# Patient Record
Sex: MALE | Race: WHITE | NOT HISPANIC OR LATINO | Employment: OTHER | URBAN - METROPOLITAN AREA
[De-identification: names, ages, dates, MRNs, and addresses within clinical notes are randomized per-mention and may not be internally consistent; named-entity substitution may affect disease eponyms.]

---

## 2017-02-09 ENCOUNTER — TRANSFERRED RECORDS (OUTPATIENT)
Dept: HEALTH INFORMATION MANAGEMENT | Facility: CLINIC | Age: 72
End: 2017-02-09

## 2017-12-18 ENCOUNTER — TRANSFERRED RECORDS (OUTPATIENT)
Dept: HEALTH INFORMATION MANAGEMENT | Facility: CLINIC | Age: 72
End: 2017-12-18

## 2018-02-20 ENCOUNTER — TRANSFERRED RECORDS (OUTPATIENT)
Dept: HEALTH INFORMATION MANAGEMENT | Facility: CLINIC | Age: 73
End: 2018-02-20

## 2018-03-24 ENCOUNTER — ANESTHESIA EVENT (OUTPATIENT)
Dept: SURGERY | Facility: CLINIC | Age: 73
End: 2018-03-24
Payer: COMMERCIAL

## 2018-03-24 NOTE — ANESTHESIA PREPROCEDURE EVALUATION
Anesthesia Evaluation     . Pt has had prior anesthetic. Type: General and MAC    No history of anesthetic complications          ROS/MED HX    ENT/Pulmonary:     (+)sleep apnea, tobacco use, Past use uses CPAP occ cmH2O , . Other pulmonary disease SOB with exertion but can do things like chop wood with frequent breaks .    Neurologic:  - neg neurologic ROS     Cardiovascular:     (+) Dyslipidemia, hypertension--CAD, -CABG-date: x1 5/2016, . : . CHF . ALMONTE, . :. valvular problems/murmurs AVR 5/2016:. Previous cardiac testing Echodate:2/9/18results:Summary:  Bioprosthetic valve is present in the aortic position. Mean gradient 14mm Hg.   Paradoxical septal motion is present. Latter consistent w/ post-thoracotomy state.  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   Left ventricular ejection fraction, by visual est, is 60-65%.  Inferior vena cava is mildly dilated w/ greater than 50% respiratory variation.  In comparison to previous study dated 5/27/2016, LVEF has improved.Stress Testdate:8-3-2000 results:normalECG reviewed date:3/1/2018 results:NSR, RBBB, T-wave abnormality--consider inferior ischemia date: results:         : 2016.   METS/Exercise Tolerance:  >4 METS   Hematologic:  - neg hematologic  ROS       Musculoskeletal:   (+) , , other musculoskeletal- left rotator cuff tear       GI/Hepatic:  - neg GI/hepatic ROS       Renal/Genitourinary:  - ROS Renal section negative       Endo:  - neg endo ROS       Psychiatric:  - neg psychiatric ROS       Infectious Disease:  - neg infectious disease ROS       Malignancy:      - no malignancy   Other:    - neg other ROS                 Physical Exam  Normal systems: cardiovascular, pulmonary and dental    Airway   Mallampati: II  TM distance: >3 FB  Neck ROM: full    Dental     Cardiovascular       Pulmonary                     Anesthesia Plan      History & Physical Review  History and physical reviewed and following  examination; no interval change.    ASA Status:  3 .    NPO Status:  > 8 hours    Plan for General, ETT and Periph. Nerve Block for postop pain with Propofol and Intravenous induction. Maintenance will be Balanced.    PONV prophylaxis:  Ondansetron (or other 5HT-3) and Dexamethasone or Solumedrol  Additional equipment: Videolaryngoscope Scanned H&P reviewed      Postoperative Care  Postoperative pain management:  IV analgesics, Oral pain medications and Peripheral nerve block (Single Shot).      Consents  Anesthetic plan, risks, benefits and alternatives discussed with:  Patient and Daughter/Son..                          .

## 2018-03-26 ASSESSMENT — LIFESTYLE VARIABLES: TOBACCO_USE: 1

## 2018-03-27 ENCOUNTER — SURGERY (OUTPATIENT)
Age: 73
End: 2018-03-27

## 2018-03-27 ENCOUNTER — HOSPITAL ENCOUNTER (OUTPATIENT)
Facility: CLINIC | Age: 73
Discharge: HOME | End: 2018-03-27
Attending: ORTHOPAEDIC SURGERY | Admitting: ORTHOPAEDIC SURGERY
Payer: COMMERCIAL

## 2018-03-27 ENCOUNTER — ANESTHESIA (OUTPATIENT)
Dept: SURGERY | Facility: CLINIC | Age: 73
End: 2018-03-27
Payer: COMMERCIAL

## 2018-03-27 VITALS
BODY MASS INDEX: 23.03 KG/M2 | HEART RATE: 57 BPM | RESPIRATION RATE: 15 BRPM | HEIGHT: 72 IN | SYSTOLIC BLOOD PRESSURE: 121 MMHG | OXYGEN SATURATION: 97 % | TEMPERATURE: 98.2 F | WEIGHT: 170 LBS | DIASTOLIC BLOOD PRESSURE: 88 MMHG

## 2018-03-27 DIAGNOSIS — M75.122 COMPLETE TEAR OF LEFT ROTATOR CUFF: Primary | ICD-10-CM

## 2018-03-27 PROCEDURE — 36000056 ZZH SURGERY LEVEL 3 1ST 30 MIN: Performed by: ORTHOPAEDIC SURGERY

## 2018-03-27 PROCEDURE — 71000012 ZZH RECOVERY PHASE 1 LEVEL 1 FIRST HR: Performed by: ORTHOPAEDIC SURGERY

## 2018-03-27 PROCEDURE — 71000027 ZZH RECOVERY PHASE 2 EACH 15 MINS: Performed by: ORTHOPAEDIC SURGERY

## 2018-03-27 PROCEDURE — 36000058 ZZH SURGERY LEVEL 3 EA 15 ADDTL MIN: Performed by: ORTHOPAEDIC SURGERY

## 2018-03-27 PROCEDURE — 25000566 ZZH SEVOFLURANE, EA 15 MIN: Performed by: ORTHOPAEDIC SURGERY

## 2018-03-27 PROCEDURE — 27110028 ZZH OR GENERAL SUPPLY NON-STERILE: Performed by: ORTHOPAEDIC SURGERY

## 2018-03-27 PROCEDURE — 25000125 ZZHC RX 250: Performed by: ORTHOPAEDIC SURGERY

## 2018-03-27 PROCEDURE — 25000128 H RX IP 250 OP 636: Performed by: NURSE ANESTHETIST, CERTIFIED REGISTERED

## 2018-03-27 PROCEDURE — 40000305 ZZH STATISTIC PRE PROC ASSESS I: Performed by: ORTHOPAEDIC SURGERY

## 2018-03-27 PROCEDURE — 25000125 ZZHC RX 250: Performed by: NURSE ANESTHETIST, CERTIFIED REGISTERED

## 2018-03-27 PROCEDURE — 37000008 ZZH ANESTHESIA TECHNICAL FEE, 1ST 30 MIN: Performed by: ORTHOPAEDIC SURGERY

## 2018-03-27 PROCEDURE — 25000128 H RX IP 250 OP 636: Performed by: ORTHOPAEDIC SURGERY

## 2018-03-27 PROCEDURE — 25000128 H RX IP 250 OP 636: Performed by: PHYSICIAN ASSISTANT

## 2018-03-27 PROCEDURE — 27210995 ZZH RX 272: Performed by: ORTHOPAEDIC SURGERY

## 2018-03-27 PROCEDURE — 37000009 ZZH ANESTHESIA TECHNICAL FEE, EACH ADDTL 15 MIN: Performed by: ORTHOPAEDIC SURGERY

## 2018-03-27 PROCEDURE — 27210794 ZZH OR GENERAL SUPPLY STERILE: Performed by: ORTHOPAEDIC SURGERY

## 2018-03-27 PROCEDURE — 25000132 ZZH RX MED GY IP 250 OP 250 PS 637: Performed by: PHYSICIAN ASSISTANT

## 2018-03-27 RX ORDER — ROPIVACAINE HYDROCHLORIDE 7.5 MG/ML
INJECTION, SOLUTION EPIDURAL; PERINEURAL PRN
Status: DISCONTINUED | OUTPATIENT
Start: 2018-03-27 | End: 2018-03-27

## 2018-03-27 RX ORDER — HYDROMORPHONE HYDROCHLORIDE 1 MG/ML
.3-.5 INJECTION, SOLUTION INTRAMUSCULAR; INTRAVENOUS; SUBCUTANEOUS EVERY 10 MIN PRN
Status: DISCONTINUED | OUTPATIENT
Start: 2018-03-27 | End: 2018-03-27 | Stop reason: HOSPADM

## 2018-03-27 RX ORDER — ONDANSETRON 2 MG/ML
4 INJECTION INTRAMUSCULAR; INTRAVENOUS EVERY 30 MIN PRN
Status: DISCONTINUED | OUTPATIENT
Start: 2018-03-27 | End: 2018-03-27 | Stop reason: HOSPADM

## 2018-03-27 RX ORDER — SODIUM CHLORIDE, SODIUM LACTATE, POTASSIUM CHLORIDE, CALCIUM CHLORIDE 600; 310; 30; 20 MG/100ML; MG/100ML; MG/100ML; MG/100ML
INJECTION, SOLUTION INTRAVENOUS CONTINUOUS
Status: DISCONTINUED | OUTPATIENT
Start: 2018-03-27 | End: 2018-03-27 | Stop reason: HOSPADM

## 2018-03-27 RX ORDER — LIDOCAINE 40 MG/G
CREAM TOPICAL
Status: DISCONTINUED | OUTPATIENT
Start: 2018-03-27 | End: 2018-03-27 | Stop reason: HOSPADM

## 2018-03-27 RX ORDER — NALOXONE HYDROCHLORIDE 0.4 MG/ML
.1-.4 INJECTION, SOLUTION INTRAMUSCULAR; INTRAVENOUS; SUBCUTANEOUS
Status: DISCONTINUED | OUTPATIENT
Start: 2018-03-27 | End: 2018-03-27 | Stop reason: HOSPADM

## 2018-03-27 RX ORDER — FENTANYL CITRATE 50 UG/ML
INJECTION, SOLUTION INTRAMUSCULAR; INTRAVENOUS PRN
Status: DISCONTINUED | OUTPATIENT
Start: 2018-03-27 | End: 2018-03-27

## 2018-03-27 RX ORDER — ROPIVACAINE HYDROCHLORIDE 5 MG/ML
INJECTION, SOLUTION EPIDURAL; INFILTRATION; PERINEURAL PRN
Status: DISCONTINUED | OUTPATIENT
Start: 2018-03-27 | End: 2018-03-27

## 2018-03-27 RX ORDER — NEOSTIGMINE METHYLSULFATE 1 MG/ML
VIAL (ML) INJECTION PRN
Status: DISCONTINUED | OUTPATIENT
Start: 2018-03-27 | End: 2018-03-27

## 2018-03-27 RX ORDER — PROPOFOL 10 MG/ML
INJECTION, EMULSION INTRAVENOUS PRN
Status: DISCONTINUED | OUTPATIENT
Start: 2018-03-27 | End: 2018-03-27

## 2018-03-27 RX ORDER — FENTANYL CITRATE 50 UG/ML
25-50 INJECTION, SOLUTION INTRAMUSCULAR; INTRAVENOUS
Status: DISCONTINUED | OUTPATIENT
Start: 2018-03-27 | End: 2018-03-27 | Stop reason: HOSPADM

## 2018-03-27 RX ORDER — CEFAZOLIN SODIUM 1 G/3ML
1 INJECTION, POWDER, FOR SOLUTION INTRAMUSCULAR; INTRAVENOUS SEE ADMIN INSTRUCTIONS
Status: DISCONTINUED | OUTPATIENT
Start: 2018-03-27 | End: 2018-03-27 | Stop reason: HOSPADM

## 2018-03-27 RX ORDER — ONDANSETRON 4 MG/1
4 TABLET, ORALLY DISINTEGRATING ORAL
Status: DISCONTINUED | OUTPATIENT
Start: 2018-03-27 | End: 2018-03-27 | Stop reason: HOSPADM

## 2018-03-27 RX ORDER — EPHEDRINE SULFATE 50 MG/ML
INJECTION, SOLUTION INTRAVENOUS PRN
Status: DISCONTINUED | OUTPATIENT
Start: 2018-03-27 | End: 2018-03-27

## 2018-03-27 RX ORDER — GABAPENTIN 300 MG/1
300 CAPSULE ORAL
Status: COMPLETED | OUTPATIENT
Start: 2018-03-27 | End: 2018-03-27

## 2018-03-27 RX ORDER — HYDROXYZINE HYDROCHLORIDE 10 MG/1
10 TABLET, FILM COATED ORAL
Status: DISCONTINUED | OUTPATIENT
Start: 2018-03-27 | End: 2018-03-27 | Stop reason: HOSPADM

## 2018-03-27 RX ORDER — GLYCOPYRROLATE 0.2 MG/ML
INJECTION, SOLUTION INTRAMUSCULAR; INTRAVENOUS PRN
Status: DISCONTINUED | OUTPATIENT
Start: 2018-03-27 | End: 2018-03-27

## 2018-03-27 RX ORDER — OXYCODONE HYDROCHLORIDE 5 MG/1
5-10 TABLET ORAL
Qty: 40 TABLET | Refills: 0
Start: 2018-03-27 | End: 2018-03-27

## 2018-03-27 RX ORDER — ONDANSETRON 4 MG/1
4 TABLET, ORALLY DISINTEGRATING ORAL EVERY 30 MIN PRN
Status: DISCONTINUED | OUTPATIENT
Start: 2018-03-27 | End: 2018-03-27 | Stop reason: HOSPADM

## 2018-03-27 RX ORDER — DEXAMETHASONE SODIUM PHOSPHATE 4 MG/ML
4 INJECTION, SOLUTION INTRA-ARTICULAR; INTRALESIONAL; INTRAMUSCULAR; INTRAVENOUS; SOFT TISSUE EVERY 10 MIN PRN
Status: DISCONTINUED | OUTPATIENT
Start: 2018-03-27 | End: 2018-03-27 | Stop reason: HOSPADM

## 2018-03-27 RX ORDER — LIDOCAINE HYDROCHLORIDE AND EPINEPHRINE 10; 10 MG/ML; UG/ML
INJECTION, SOLUTION INFILTRATION; PERINEURAL PRN
Status: DISCONTINUED | OUTPATIENT
Start: 2018-03-27 | End: 2018-03-27 | Stop reason: HOSPADM

## 2018-03-27 RX ORDER — DEXAMETHASONE SODIUM PHOSPHATE 4 MG/ML
INJECTION, SOLUTION INTRA-ARTICULAR; INTRALESIONAL; INTRAMUSCULAR; INTRAVENOUS; SOFT TISSUE PRN
Status: DISCONTINUED | OUTPATIENT
Start: 2018-03-27 | End: 2018-03-27

## 2018-03-27 RX ORDER — CEFAZOLIN SODIUM 2 G/100ML
2 INJECTION, SOLUTION INTRAVENOUS
Status: COMPLETED | OUTPATIENT
Start: 2018-03-27 | End: 2018-03-27

## 2018-03-27 RX ORDER — OXYCODONE HYDROCHLORIDE 5 MG/1
5 TABLET ORAL
Status: COMPLETED | OUTPATIENT
Start: 2018-03-27 | End: 2018-03-27

## 2018-03-27 RX ORDER — MEPERIDINE HYDROCHLORIDE 25 MG/ML
12.5 INJECTION INTRAMUSCULAR; INTRAVENOUS; SUBCUTANEOUS
Status: DISCONTINUED | OUTPATIENT
Start: 2018-03-27 | End: 2018-03-27 | Stop reason: HOSPADM

## 2018-03-27 RX ORDER — HYDROXYZINE HYDROCHLORIDE 25 MG/1
25 TABLET, FILM COATED ORAL EVERY 6 HOURS PRN
Qty: 30 TABLET | Refills: 0
Start: 2018-03-27 | End: 2022-05-11

## 2018-03-27 RX ORDER — ONDANSETRON 2 MG/ML
INJECTION INTRAMUSCULAR; INTRAVENOUS PRN
Status: DISCONTINUED | OUTPATIENT
Start: 2018-03-27 | End: 2018-03-27

## 2018-03-27 RX ORDER — ACETAMINOPHEN 325 MG/1
650 TABLET ORAL
Status: DISCONTINUED | OUTPATIENT
Start: 2018-03-27 | End: 2018-03-27 | Stop reason: HOSPADM

## 2018-03-27 RX ORDER — LIDOCAINE HCL/EPINEPHRINE/PF 2%-1:200K
VIAL (ML) INJECTION PRN
Status: DISCONTINUED | OUTPATIENT
Start: 2018-03-27 | End: 2018-03-27

## 2018-03-27 RX ORDER — OXYCODONE HYDROCHLORIDE 5 MG/1
5-10 TABLET ORAL EVERY 4 HOURS PRN
Qty: 40 TABLET | Refills: 0 | Status: SHIPPED | OUTPATIENT
Start: 2018-03-27 | End: 2022-05-11

## 2018-03-27 RX ORDER — ACETAMINOPHEN 325 MG/1
975 TABLET ORAL ONCE
Status: COMPLETED | OUTPATIENT
Start: 2018-03-27 | End: 2018-03-27

## 2018-03-27 RX ORDER — HYDROXYZINE HYDROCHLORIDE 50 MG/1
50 TABLET, FILM COATED ORAL
Status: DISCONTINUED | OUTPATIENT
Start: 2018-03-27 | End: 2018-03-27 | Stop reason: HOSPADM

## 2018-03-27 RX ORDER — DIMENHYDRINATE 50 MG/ML
25 INJECTION, SOLUTION INTRAMUSCULAR; INTRAVENOUS
Status: DISCONTINUED | OUTPATIENT
Start: 2018-03-27 | End: 2018-03-27 | Stop reason: HOSPADM

## 2018-03-27 RX ORDER — LIDOCAINE HYDROCHLORIDE 10 MG/ML
INJECTION, SOLUTION INFILTRATION; PERINEURAL PRN
Status: DISCONTINUED | OUTPATIENT
Start: 2018-03-27 | End: 2018-03-27

## 2018-03-27 RX ADMIN — ACETAMINOPHEN 975 MG: 325 TABLET, FILM COATED ORAL at 12:15

## 2018-03-27 RX ADMIN — SODIUM CHLORIDE, POTASSIUM CHLORIDE, SODIUM LACTATE AND CALCIUM CHLORIDE: 600; 310; 30; 20 INJECTION, SOLUTION INTRAVENOUS at 15:16

## 2018-03-27 RX ADMIN — EPHEDRINE SULFATE 7.5 MG: 50 INJECTION, SOLUTION INTRAVENOUS at 15:51

## 2018-03-27 RX ADMIN — ONDANSETRON 4 MG: 2 INJECTION INTRAMUSCULAR; INTRAVENOUS at 16:02

## 2018-03-27 RX ADMIN — LIDOCAINE HYDROCHLORIDE,EPINEPHRINE BITARTRATE 5 ML: 20; .005 INJECTION, SOLUTION EPIDURAL; INFILTRATION; INTRACAUDAL; PERINEURAL at 14:42

## 2018-03-27 RX ADMIN — ROPIVACAINE HYDROCHLORIDE 10 ML: 5 INJECTION, SOLUTION EPIDURAL; INFILTRATION; PERINEURAL at 14:44

## 2018-03-27 RX ADMIN — GLYCOPYRROLATE 0.6 MG: 0.2 INJECTION, SOLUTION INTRAMUSCULAR; INTRAVENOUS at 16:02

## 2018-03-27 RX ADMIN — OXYCODONE HYDROCHLORIDE 5 MG: 5 TABLET ORAL at 17:36

## 2018-03-27 RX ADMIN — EPINEPHRINE 9500 ML: 1 INJECTION, SOLUTION, CONCENTRATE INTRAVENOUS at 16:04

## 2018-03-27 RX ADMIN — GABAPENTIN 300 MG: 300 CAPSULE ORAL at 12:16

## 2018-03-27 RX ADMIN — LIDOCAINE HYDROCHLORIDE AND EPINEPHRINE 20 ML: 10; 10 INJECTION, SOLUTION INFILTRATION; PERINEURAL at 15:14

## 2018-03-27 RX ADMIN — CEFAZOLIN SODIUM 2 G: 2 INJECTION, SOLUTION INTRAVENOUS at 14:47

## 2018-03-27 RX ADMIN — ROPIVACAINE HYDROCHLORIDE 20 ML: 7.5 INJECTION, SOLUTION EPIDURAL; PERINEURAL at 14:43

## 2018-03-27 RX ADMIN — SODIUM CHLORIDE, POTASSIUM CHLORIDE, SODIUM LACTATE AND CALCIUM CHLORIDE: 600; 310; 30; 20 INJECTION, SOLUTION INTRAVENOUS at 12:08

## 2018-03-27 RX ADMIN — Medication 4 MG: at 16:02

## 2018-03-27 RX ADMIN — PROPOFOL 100 MG: 10 INJECTION, EMULSION INTRAVENOUS at 14:56

## 2018-03-27 RX ADMIN — MIDAZOLAM 2 MG: 1 INJECTION INTRAMUSCULAR; INTRAVENOUS at 14:36

## 2018-03-27 RX ADMIN — DEXAMETHASONE SODIUM PHOSPHATE 8 MG: 4 INJECTION, SOLUTION INTRA-ARTICULAR; INTRALESIONAL; INTRAMUSCULAR; INTRAVENOUS; SOFT TISSUE at 14:56

## 2018-03-27 RX ADMIN — GLYCOPYRROLATE 0.2 MG: 0.2 INJECTION, SOLUTION INTRAMUSCULAR; INTRAVENOUS at 14:56

## 2018-03-27 RX ADMIN — LIDOCAINE HYDROCHLORIDE 1 ML: 10 INJECTION, SOLUTION EPIDURAL; INFILTRATION; INTRACAUDAL; PERINEURAL at 12:09

## 2018-03-27 RX ADMIN — PHENYLEPHRINE HYDROCHLORIDE 100 MCG: 10 INJECTION, SOLUTION INTRAMUSCULAR; INTRAVENOUS; SUBCUTANEOUS at 15:12

## 2018-03-27 RX ADMIN — LIDOCAINE HYDROCHLORIDE 50 MG: 10 INJECTION, SOLUTION INFILTRATION; PERINEURAL at 14:56

## 2018-03-27 RX ADMIN — EPHEDRINE SULFATE 10 MG: 50 INJECTION, SOLUTION INTRAVENOUS at 15:18

## 2018-03-27 RX ADMIN — ROCURONIUM BROMIDE 40 MG: 10 INJECTION INTRAVENOUS at 14:56

## 2018-03-27 RX ADMIN — FENTANYL CITRATE 100 MCG: 50 INJECTION, SOLUTION INTRAMUSCULAR; INTRAVENOUS at 14:36

## 2018-03-27 RX ADMIN — PHENYLEPHRINE HYDROCHLORIDE 100 MCG: 10 INJECTION, SOLUTION INTRAMUSCULAR; INTRAVENOUS; SUBCUTANEOUS at 15:06

## 2018-03-27 NOTE — IP AVS SNAPSHOT
MRN:[id number]                      After Visit Summary   3/27/2018    Sunny Inman    MRN: [id number]           Thank you!     Thank you for choosing [facility] for your care. Our goal is always to provide you with excellent care. Hearing back from our patients is one way we can continue to improve our services. Please take a few minutes to complete the written survey that you may receive in the mail after you visit with us. Thank you!        Patient Information     Date Of Birth          1945        About your hospital stay     You were admitted on:  March 27, 2018 You last received care in the:  [facility] PreOP/Phase II    You were discharged on:  March 27, 2018       Who to Call     For medical emergencies, please call 911.  For non-urgent questions about your medical care, please call your primary care provider or clinic, [phone]  For questions related to your surgery, please call your surgery clinic        Attending Provider     Provider Specialty    Alex Pacheco MD Hand Surgery       Primary Care Provider Office Phone # Fax #    [facility] [phone]      After Care Instructions      Diet as Tolerated       Return to diet before surgery, unless instructed otherwise.            Discharge Instructions       Review outpatient procedure discharge instructions with patient as directed by Provider            Dressing Change        IF leaking, remove and redress. Wash hands before and after and use gloves.            Ice to affected area       Ice pack to surgical site every 15 minutes per hour for 24 hours once nerve bloch has worn off.            No Alcohol       For 24 hours post procedure            No driving or operating machinery        until the day after procedure            Notify Provider       For signs and symptoms of infection: Fever greater than 101, redness, swelling, heat at site, drainage, pus.            Remove dressing - at 72  hours        Change dressing on third day after surgery. May get wet in shower, but do not soak/submerge/scrub. Keep covered as needed with gauze or band-aids.            Return to clinic       Return to clinic in 1-2 weeks or as scheduled.            Weight bearing - As tolerated       Sling should be worn until nerve block has worn off then as needed for comfort. Avoid lifting away from body and overhead.                  Further instructions from your care team                           Same Day Surgery Discharge Instructions  Special Precautions After Surgery - Adult    1. It is not unusual to feel lightheaded or faint, up to 24 hours after surgery or while taking pain medication.  If you have these symptoms; sit for a few minutes before standing and have someone assist you when getting up.  2. You should rest and relax for the next 24 hours and must have someone stay with you for at least 24 hours after your discharge.  3. DO NOT DRIVE any vehicle or operate mechanical equipment for 24 hours following the end of your surgery.  DO NOT DRIVE while taking narcotic pain medications that have been prescribed by your physician.  If you had a limb operated on, you must be able to use it fully to drive.  4. DO NOT drink alcoholic beverages for 24 hours following surgery or while taking prescription pain medication.  5. Drink clear liquids (apple juice, ginger ale, broth, 7-Up, etc.).  Progress to your regular diet as you feel able.  6. Any questions call your physician and do not make important decisions for 24 hours.    ACTIVITY  ? Rest today.  No activity or diet restrictions.  ? Resume activity as tolerated.  ? Restrictions: per MD and anesthesia for nerve block  ? See printed discharge sheet.     INCISIONAL CARE  ? May remove dressing in 72 hours.  ? May bathe / shower after 72 hours.  ? Keep incision dry and covered  ? Keep extremity elevated above the level of the heart if possible.    ? Apply ice 15 minutes out  "of every hour while awake.  ? Be alert for signs of infection:  redness, swelling, heat, drainage of pus, and/or elevated temperature.  Contact your doctor if these occur.        Call for an appointment to return to the clinic in 7-14 days.    Medications:  ? Acetaminophen (Tylenol):  Next dose: as needed.  ?  Oxycodone :  Next dose: as needed but start tonight before going to bed. Take per anesthesia guidelines /block direction.  ? Hydroxyzine (Vistaril):  Next dose: as needed but start tonight before going to bed. Take per anesthesia guidelines /block direction..  ? Ibuprofen (Motrin, Advil):  Next dose: as needed.  ? Stool softener to prevent constipation  ? Follow the instructions on the bottle.     Additional discharge instructions: follow anesthesia nerve block instructions  __________________________________________________________________________________________________________________________________  IMPORTANT NUMBERS:    [facility] Main Number:  [phone], [phone]  Pharmacy:  [phone]  Same Day Surgery:  [phone], Monday - Friday until 8:30 p.m.  Emergency Room:  [phone]      [facility]:  [phone]             Pending Results     No orders found from 3/25/2018 to 3/28/2018.            Admission Information     Date & Time Provider Department Dept. Phone    3/27/2018 Alex Pacheco MD [facility] PreOP/Phase [phone]      Your Vitals Were     Blood Pressure Pulse Temperature Respirations Height Weight    160/94 57 98.2  F (36.8  C) (Oral) 14 1.829 m (6') 77.1 kg (170 lb)    Pulse Oximetry BMI (Body Mass Index)                92% 23.06 kg/m2          [employer] Information     [employer] lets you send messages to your doctor, view your test results, renew your prescriptions, schedule appointments and more. To sign up, go to [email or web address] . Click on \"Log in\" on the left side of the screen, which will take you to the Welcome page. Then click on \"Sign up " "Now\" on the right side of the page.     You will be asked to enter the access code listed below, as well as some personal information. Please follow the directions to create your username and password.     Your access code is: [email or web address]  Expires: 2018  4:21 PM     Your access code will  in 90 days. If you need help or a new code, please call your [facility] clinic or [phone].        Care EveryWhere ID     This is your Care EveryWhere ID. This could be used by other organizations to access your [facility] medical records  [id number]        Equal Access to Services     : Hadjc Pineda, antoinette dallas, navin burnett, alton peralta . So [facility] [phone].    ATENCIÓN: Si habla español, tiene a simms disposición servicios gratuitos de asistencia lingüística. Llame al [phone].    We comply with applicable federal civil rights laws and [place] laws. We do not discriminate on the basis of race, color, national origin, age, disability, sex, sexual orientation, or gender identity.               Review of your medicines      START taking        Dose / Directions    hydrOXYzine 25 MG tablet   Commonly known as:  ATARAX   Used for:  Complete tear of left rotator cuff        Dose:  25 mg   Take 1 tablet (25 mg) by mouth every 6 hours as needed for itching (and nausea)   Quantity:  30 tablet   Refills:  0       oxyCODONE IR 5 MG tablet   Commonly known as:  ROXICODONE   Used for:  Complete tear of left rotator cuff        Dose:  5-10 mg   Take 1-2 tablets (5-10 mg) by mouth every 4 hours as needed for pain or other (Moderate to Severe)   Quantity:  40 tablet   Refills:  0         CONTINUE these medicines which have NOT CHANGED        Dose / Directions    ASPIRIN PO        Dose:  81 mg   Take 81 mg by mouth   Refills:  0       ATORVASTATIN CALCIUM PO        Dose:  80 mg   Take 80 mg by mouth daily   Refills:  0       CARVEDILOL PO        Dose:  " 25 mg   Take 25 mg by mouth 2 times daily (with meals)   Refills:  0       LISINOPRIL PO   Indication:  High Blood Pressure Disorder        Dose:  10 mg   Take 10 mg by mouth daily   Refills:  0            Where to get your medicines      Some of these will need a paper prescription and others can be bought over the counter. Ask your nurse if you have questions.     Bring a paper prescription for each of these medications     oxyCODONE IR 5 MG tablet       You don't need a prescription for these medications     hydrOXYzine 25 MG tablet                Protect others around you: Learn how to safely use, store and throw away your medicines at www.disposemymeds.org.        Information about OPIOIDS     PRESCRIPTION OPIOIDS: WHAT YOU NEED TO KNOW    Prescription opioids can be used to help relieve moderate to severe pain and are often prescribed following a surgery or injury, or for certain health conditions. These medications can be an important part of treatment but also come with serious risks. It is important to work with your health care provider to make sure you are getting the safest, most effective care.    WHAT ARE THE RISKS AND SIDE EFFECTS OF OPIOID USE?  Prescription opioids carry serious risks of addiction and overdose, especially with prolonged use. An opioid overdose, often marked by slowed breathing can cause sudden death. The use of prescription opioids can have a number of side effects as well, even when taken as directed:      Tolerance - meaning you might need to take more of a medication for the same pain relief    Physical dependence - meaning you have symptoms of withdrawal when a medication is stopped    Increased sensitivity to pain    Constipation    Nausea, vomiting, and dry mouth    Sleepiness and dizziness    Confusion    Depression    Low levels of testosterone that can result in lower sex drive, energy, and strength    Itching and sweating    RISKS ARE GREATER WITH:    History of drug  misuse, substance use disorder, or overdose    Mental health conditions (such as depression or anxiety)    Sleep apnea    Older age (65 years or older)    Pregnancy    Avoid alcohol while taking prescription opioids.   Also, unless specifically advised by your health care provider, medications to avoid include:    Benzodiazepines (such as Xanax or Valium)    Muscle relaxants (such as Soma or Flexeril)    Hypnotics (such as Ambien or Lunesta)    Other prescription opioids    KNOW YOUR OPTIONS:  Talk to your health care provider about ways to manage your pain that do not involve prescription opioids. Some of these options may actually work better and have fewer risks and side effects:    Pain relievers such as acetaminophen, ibuprofen, and naproxen    Some medications that are also used for depression or seizures    Physical therapy and exercise    Cognitive behavioral therapy, a psychological, goal-directed approach, in which patients learn how to modify physical, behavioral, and emotional triggers of pain and stress    IF YOU ARE PRESCRIBED OPIOIDS FOR PAIN:    Never take opioids in greater amounts or more often than prescribed    Follow up with your primary health care provider and work together to create a plan on how to manage your pain.    Talk about ways to help manage your pain that do not involve prescription opioids    Talk about all concerns and side effects    Help prevent misuse and abuse    Never sell or share prescription opioids    Never use another person's prescription opioids    Store prescription opioids in a secure place and out of reach of others (this may include visitors, children, friends, and family)    Visit www.cdc.gov/drugoverdose to learn about risks of opioid abuse and overdose    If you believe you may be struggling with addiction, tell your health care provider and ask for guidance or call [facility]'s National Helpline at 2-108-338-HELP    LEARN MORE /  www.cdc.gov/drugoverdose/prescribing/guideline.html    Safely dispose of unused prescription opioids: Find your local drug take-back programs and more information about the importance of safe disposal at www.doseofreality.mn.gov             Medication List: This is a list of all your medications and when to take them. Check marks below indicate your daily home schedule. Keep this list as a reference.      Medications           Morning Afternoon Evening Bedtime As Needed    ASPIRIN PO   Take 81 mg by mouth                                ATORVASTATIN CALCIUM PO   Take 80 mg by mouth daily                                CARVEDILOL PO   Take 25 mg by mouth 2 times daily (with meals)                                hydrOXYzine 25 MG tablet   Commonly known as:  ATARAX   Take 1 tablet (25 mg) by mouth every 6 hours as needed for itching (and nausea)                                LISINOPRIL PO   Take 10 mg by mouth daily                                oxyCODONE IR 5 MG tablet   Commonly known as:  ROXICODONE   Take 1-2 tablets (5-10 mg) by mouth every 4 hours as needed for pain or other (Moderate to Severe)

## 2018-03-27 NOTE — ANESTHESIA POSTPROCEDURE EVALUATION
Patient: Sunny Inman    Procedure(s):  Left Shoulder Arthroscopic Subacromial Decompression, Distal Clavicle Excision - Wound Class: I-Clean    Diagnosis:left shoulder rotator cuff tear  Diagnosis Additional Information: No value filed.    Anesthesia Type:  General, ETT, Periph. Nerve Block for postop pain    Note:  Anesthesia Post Evaluation    Patient location during evaluation: Phase 2 and Bedside  Patient participation: Able to participate in evaluation but full recovery from regional anesthesia has not yet ocurrred but is anticipated to occur within 48 hours  Level of consciousness: awake and alert  Pain management: adequate  Airway patency: patent  Cardiovascular status: acceptable  Respiratory status: acceptable  Hydration status: acceptable  PONV: none     Anesthetic complications: None          Last vitals:  Vitals:    03/27/18 1710 03/27/18 1715 03/27/18 1730   BP: (!) 160/94 (!) 157/97 140/85   Pulse:      Resp: 14 15 15   Temp: 36.8  C (98.2  F)     SpO2: 92% 92% 93%         Electronically Signed By: MANISH Levin CRNA  March 27, 2018  6:24 PM

## 2018-03-27 NOTE — ANESTHESIA CARE TRANSFER NOTE
Patient: Sunny Inman    Procedure(s):  Left Shoulder Arthroscopic Subacromial Decompression, Distal Clavicle Excision - Wound Class: I-Clean    Diagnosis: left shoulder rotator cuff tear  Diagnosis Additional Information: No value filed.    Anesthesia Type:   General, ETT, Periph. Nerve Block for postop pain     Note:  Airway :Face Mask  Patient transferred to:PACU  Handoff Report: Identifed the Patient, Identified the Reponsible Provider, Reviewed the pertinent medical history, Discussed the surgical course, Reviewed Intra-OP anesthesia mangement and issues during anesthesia, Set expectations for post-procedure period and Allowed opportunity for questions and acknowledgement of understanding      Vitals: (Last set prior to Anesthesia Care Transfer)    CRNA VITALS  3/27/2018 1543 - 3/27/2018 1613      3/27/2018             Pulse: 88    SpO2: 99 %    Resp Rate (observed): 9                Electronically Signed By: MANISH Cooper CRNA  March 27, 2018  4:13 PM

## 2018-03-27 NOTE — DISCHARGE INSTRUCTIONS
Same Day Surgery Discharge Instructions  Special Precautions After Surgery - Adult    1. It is not unusual to feel lightheaded or faint, up to 24 hours after surgery or while taking pain medication.  If you have these symptoms; sit for a few minutes before standing and have someone assist you when getting up.  2. You should rest and relax for the next 24 hours and must have someone stay with you for at least 24 hours after your discharge.  3. DO NOT DRIVE any vehicle or operate mechanical equipment for 24 hours following the end of your surgery.  DO NOT DRIVE while taking narcotic pain medications that have been prescribed by your physician.  If you had a limb operated on, you must be able to use it fully to drive.  4. DO NOT drink alcoholic beverages for 24 hours following surgery or while taking prescription pain medication.  5. Drink clear liquids (apple juice, ginger ale, broth, 7-Up, etc.).  Progress to your regular diet as you feel able.  6. Any questions call your physician and do not make important decisions for 24 hours.    ACTIVITY  ? Rest today.  No activity or diet restrictions.  ? Resume activity as tolerated.  ? Restrictions: per MD and anesthesia for nerve block  ? See printed discharge sheet.     INCISIONAL CARE  ? May remove dressing in 72 hours.  ? May bathe / shower after 72 hours.  ? Keep incision dry and covered  ? Keep extremity elevated above the level of the heart if possible.    ? Apply ice 15 minutes out of every hour while awake.  ? Be alert for signs of infection:  redness, swelling, heat, drainage of pus, and/or elevated temperature.  Contact your doctor if these occur.        Call for an appointment to return to the clinic in 7-14 days.    Medications:  ? Acetaminophen (Tylenol):  Next dose: as needed.  ?  Oxycodone :  Next dose: as needed but start tonight before going to bed. Take per anesthesia guidelines /block direction.  ? Hydroxyzine (Vistaril):  Next dose: as  needed but start tonight before going to bed. Take per anesthesia guidelines /block direction..  ? Ibuprofen (Motrin, Advil):  Next dose: as needed.  ? Stool softener to prevent constipation  ? Follow the instructions on the bottle.     Additional discharge instructions: follow anesthesia nerve block instructions  __________________________________________________________________________________________________________________________________  IMPORTANT NUMBERS:    [facility] Main Number:  [phone], [phone]  Pharmacy:  [phone]  Same Day Surgery:  [phone], Monday - Friday until 8:30 p.m.  Emergency Room:  [phone]      [facility]:  [phone]

## 2018-03-27 NOTE — ANESTHESIA PROCEDURE NOTES
Peripheral nerve/Neuraxial procedure note : Interscalene  Pre-Procedure  Performed by  CHRIS SHERMAN   Location: pre-op      Pre-Anesthestic Checklist: patient identified, IV checked, site marked, risks and benefits discussed, informed consent, monitors and equipment checked, pre-op evaluation, at physician/surgeon's request and post-op pain management    Timeout  Correct Patient: Yes   Correct Procedure: Yes   Correct Site: Yes   Correct Laterality: Yes   Correct Position: Yes   Site Marked: Yes   .   Procedure Documentation    Diagnosis:POST OP PAIN.    Procedure:  left  Interscalene.  Local skin infiltrated with 2 mL of 1% lidocaine.     Ultrasound used to identify targeted nerve, plexus, or vascular marker and placed a needle adjacent to it., Ultrasound was used to visualize the spread of the anesthetic in close proximity to the above stated nerve. A permanent image is entered into the patient's record.  Patient Prep;mask, sterile gloves, chlorhexidine gluconate and isopropyl alcohol, patient draped.  Nerve Stim: Initial Level 1 mA.  Lowest motor response 0.4 mA..  Needle: insulated Needle Gauge: 21.    Needle Length (Inches) 2  Insertion Method: Single Shot.       Assessment/Narrative  Paresthesias: No.  Injection made incrementally with aspirations every 5 mL..  The placement was negative for: blood aspirated, painful injection and site bleeding.  Bolus given via needle..   Secured via.   Complications: none. Test dose of 5 mL lidocaine 1.5% w/ 1:200,000 epinephrine at 14:42.  Test dose negative for signs of intravascular, subdural or intrathecal injection. Comments:  Pt tolerated procedure well.

## 2018-03-27 NOTE — IP AVS SNAPSHOT
[facility] PreOP/Phase II    [address] [address]    Phone:  [phone]    Fax:  [phone]                                       After Visit Summary   3/27/2018    Sunny Inman    MRN: [id number]           After Visit Summary Signature Page     I have received my discharge instructions, and my questions have been answered. I have discussed any challenges I see with this plan with the nurse or doctor.    ..........................................................................................................................................  Patient/Patient Representative Signature      ..........................................................................................................................................  Patient Representative Print Name and Relationship to Patient    ..................................................               ................................................  Date                                            Time    ..........................................................................................................................................  Reviewed by Signature/Title    ...................................................              ..............................................  Date                                                            Time

## 2018-03-27 NOTE — OP NOTE
Preoperative diagnosis:  1. Left shoulder impingement  2. Left shoulder proximal biceps tendon rupture  3. Left shoulder AC joint arthritis  4. Left shoulder large chronic rotator cuff tear    Postoperative diagnosis:  1. Left shoulder impingement  2. Left shoulder proximal biceps tendon rupture  3. Left shoulder AC joint arthritis  4. Shoulder large chronic irreparable rotator cuff tear    Procedure:  1. Left shoulder arthroscopic debridement of subacromial bursa and rotator cuff including a double interval slide in an attempt to perform a rotator cuff repair  2. Left shoulder arthroscopic distal clavicle excision with limited anterior acromioplasty    Surgeon: Alex Pacheco M.D.    Asst.: Shraddha Pérez PA-C    Anesthesia: Gen. with interscalene    Procedure: The patient was taken to the main operating suites. Once there he was transferred over to the operating table in the supine position. He was induced and intubated per anesthesia. He was transferred up to the beachchair position and all pressure points were carefully padded and protected including both ulnar nerves. Left upper extremity was prepped and draped in the usual sterile fashion. Left arm was placed in the spider arm amin. Standard outside in technique was utilized to establish all portals with an 11 blade knife through skin only. The glenohumeral joint and subacromial space were each infiltrated with 15 cc of 1% lidocaine with 1 100,000 epinephrine. Standard posterior viewing and anterior working portals were established. The glenohumeral joint was examined and grade 2 and some grade 3 chondromalacia was noted throughout the glenoid and humeral head. The patient had a large rotator cuff tear encompassing the entire supraspinatus retracted to the level of the glenoid. In fact the retraction was even medial to the glenoid. There is a short stump of tendon pulley less than a centimeter. A fiber tape was placed in this to use as a retraction  suture initially with the intent on ultimately performing a rip stop repair. A posterior interval slide was performed first and this mobilized the tendon to some extent so we proceeded with an anterior interval slide. This mobilized the tendon only to the joint line. In view of this as well as the chronicity of the tear and the fact that the muscle belly appeared to have more of a brownish tan appearance rather than a red appearance is my impression that this was not a repairable rotator cuff tear. Lastly the patient had evidence of significant chondromalacia also making an attempt at repair less attractive. The high-speed arthroscopic bur was then utilized to perform a limited anteromedial acromioplasty and the distal clavicle was removed with a high-speed arthroscopic bur. Approximately 8 mm of the clavicle was removed preserving the superior and posterior stabilizing ligaments. The scope was removed and portals were closed with 3-0 nylon in horizontal mattress fashion. A dressing consisting of Adaptic gauze 4 x 4 fluffs sterile ABDs and tape was then applied. The patient's arm was placed in a sling.    The PA was medically necessary to ensure smooth progression and safe progression of the case. PA was especially medically necessary to manage the arthroscope during an attempt at repairing the rotator cuff and performing the interval slides. The PA was present from induction as a shooting through wound closure and application of the dressing and sling.

## 2020-11-09 ENCOUNTER — HOSPITAL ENCOUNTER (EMERGENCY)
Facility: CLINIC | Age: 75
Discharge: HOME | End: 2020-11-09
Attending: EMERGENCY MEDICINE | Admitting: EMERGENCY MEDICINE
Payer: COMMERCIAL

## 2020-11-09 ENCOUNTER — APPOINTMENT (OUTPATIENT)
Dept: ULTRASOUND IMAGING | Facility: CLINIC | Age: 75
End: 2020-11-09
Attending: EMERGENCY MEDICINE
Payer: COMMERCIAL

## 2020-11-09 VITALS
RESPIRATION RATE: 16 BRPM | HEART RATE: 60 BPM | WEIGHT: 175 LBS | TEMPERATURE: 97.7 F | OXYGEN SATURATION: 97 % | DIASTOLIC BLOOD PRESSURE: 75 MMHG | SYSTOLIC BLOOD PRESSURE: 134 MMHG | BODY MASS INDEX: 23.73 KG/M2

## 2020-11-09 DIAGNOSIS — S86.811A STRAIN OF RIGHT CALF MUSCLE: ICD-10-CM

## 2020-11-09 PROCEDURE — 99284 EMERGENCY DEPT VISIT MOD MDM: CPT | Mod: 25 | Performed by: EMERGENCY MEDICINE

## 2020-11-09 PROCEDURE — 93971 EXTREMITY STUDY: CPT | Mod: RT

## 2020-11-09 PROCEDURE — 99282 EMERGENCY DEPT VISIT SF MDM: CPT | Performed by: EMERGENCY MEDICINE

## 2020-11-09 NOTE — ED AVS SNAPSHOT
[facility] Emergency Dept  [address] [address]  Phone: [phone]  Fax: [phone]                                    Sunny Inman   MRN: [id number]    Department: [facility] Emergency Dept   Date of Visit: 11/9/2020           After Visit Summary Signature Page    I have received my discharge instructions, and my questions have been answered. I have discussed any challenges I see with this plan with the nurse or doctor.    ..........................................................................................................................................  Patient/Patient Representative Signature      ..........................................................................................................................................  Patient Representative Print Name and Relationship to Patient    ..................................................               ................................................  Date                                   Time    ..........................................................................................................................................  Reviewed by Signature/Title    ...................................................              ..............................................  Date                                               Time          22EPIC Rev 08/18

## 2020-11-09 NOTE — DISCHARGE INSTRUCTIONS
Tylenol for discomfort, activity as tolerated, recheck/return here for progressive pain, swelling, redness, shortness of air, chest pain, passing out or any other concern

## 2020-11-09 NOTE — ED PROVIDER NOTES
History     Chief Complaint   Patient presents with     Leg Pain     RLL pain started today at 0800 while walking through [employer]  Sunny Inman is a 75 year old male who presents with right lower leg pain described as sharp, radiates up the back of the right calf, starts mid Achilles tendon, onset about 8:00 this morning walking through [employer], he did not step on anything twisted his foot or other trigger that he is aware of.  He is not had such pain in the past.  He is not get calf cramps.  Denies shortness of air or chest pain.  No history of DVT/pulmonary embolism.  Denies risk for same.    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     Priority: Medium        Past Medical History:    Past Medical History:   Diagnosis Date     Heart disease      Hypertension      Sleep apnea        Past Surgical History:    Past Surgical History:   Procedure Laterality Date     ARTHROSCOPY SHOULDER ROTATOR CUFF REPAIR Left 3/27/2018    Procedure: ARTHROSCOPY SHOULDER ROTATOR CUFF REPAIR;  Left Shoulder Arthroscopic Subacromial Decompression, Distal Clavicle Excision;  Surgeon: Alex Pacheco MD;  Location: [facility] OR     PHACOEMULSIFICATION WITH STANDARD INTRAOCULAR LENS IMPLANT  11/15/2012    Procedure: PHACOEMULSIFICATION WITH STANDARD INTRAOCULAR LENS IMPLANT;  Right kelman phacoemulsification with intraocular lens implant;  Surgeon: Aaron Salas MD;  Location: [facility] OR     PHACOEMULSIFICATION WITH STANDARD INTRAOCULAR LENS IMPLANT  11/29/2012    Procedure: PHACOEMULSIFICATION WITH STANDARD INTRAOCULAR LENS IMPLANT;  Left Kelman phacoemulsification with intraocular lens implant;  Surgeon: Aaron Salas MD;  Location: [facility] OR       Family History:    No family history on file.    Social History:  Marital Status:   [2]  Social History     Tobacco Use      Smoking status: Not on file   Substance Use Topics     Alcohol use: Not on file     Drug use: Not on file        Medications:         ASPIRIN PO       ATORVASTATIN CALCIUM PO       CARVEDILOL PO       hydrOXYzine (ATARAX) 25 MG tablet       LISINOPRIL PO       oxyCODONE IR (ROXICODONE) 5 MG tablet          Review of Systems  Problem focused review of systems otherwise negative      Physical Exam   BP: 134/75  Pulse: 60  Temp: 97.7  F (36.5  C)  Resp: 16  Weight: 79.4 kg (175 lb)  SpO2: 96 %      Physical Exam  Nontoxic-appearing no respiratory distress alert and oriented  Skin pink warm and dry  Head atraumatic normocephalic  Right lower extremity pulses are intact, sensation intact light touch, there is no swelling no redness no warmth, there is tenderness along the mid calf down into the Achilles tendon aponeurosis, this reproduces his pain complaint.  ED Course        Procedures               Critical Care time:  none               Results for orders placed or performed during the hospital encounter of 11/09/20 (from the past 24 hour(s))   US Lower Extremity Venous Duplex Right    Narrative    US LOWER EXTREMITY VENOUS DUPLEX RIGHT 11/9/2020 2:41 PM    CLINICAL HISTORY: r/o DVT. no hx of DVT  TECHNIQUE: Venous Duplex ultrasound of the right lower extremity with  and without compression, augmentation and duplex. Color flow and  spectral Doppler with waveform analysis performed.    COMPARISON: None.    FINDINGS: Exam includes the common femoral, femoral, popliteal, and  contralateral common femoral veins as well as segmentally visualized  deep calf veins and greater saphenous vein.     RIGHT: No deep vein thrombosis. No superficial thrombophlebitis. No  popliteal cyst.      Impression    IMPRESSION:  1.  No deep venous thrombosis in the right lower extremity.    WILMAR DORAN MD       Medications - No data to display    Assessments & Plan (with Medical Decision Making)  Patient complains of right calf pain  details per HPI.  Usual differential considered include but not limited to cellulitis, deep tissue infection, DVT, ischemia versus other.  Exam unremarkable with exception of some tenderness to the mid calf.  Right lower lower extremity duplex venous ultrasounds negative for DVT.  Findings are consistent with strain.  Tylenol for discomfort watchful waiting appropriate, return here for progressive pain, swelling, redness or any other concern.  If symptoms persist for greater than 1 week consider following up with primary care regarding repeat ultrasound.     I have reviewed the nursing notes.    I have reviewed the findings, diagnosis, plan and need for follow up with the patient.          New Prescriptions    No medications on file       Final diagnoses:   Strain of right calf muscle       11/9/2020   [facility] EMERGENCY DEPT     Daniel Real MD  11/09/20 [id number]

## 2022-05-11 ENCOUNTER — APPOINTMENT (OUTPATIENT)
Dept: GENERAL RADIOLOGY | Facility: CLINIC | Age: 77
End: 2022-05-11
Attending: EMERGENCY MEDICINE
Payer: COMMERCIAL

## 2022-05-11 ENCOUNTER — APPOINTMENT (OUTPATIENT)
Dept: CT IMAGING | Facility: CLINIC | Age: 77
End: 2022-05-11
Attending: EMERGENCY MEDICINE
Payer: COMMERCIAL

## 2022-05-11 ENCOUNTER — HOSPITAL ENCOUNTER (OUTPATIENT)
Facility: CLINIC | Age: 77
Setting detail: OBSERVATION
Discharge: HOME | End: 2022-05-12
Attending: EMERGENCY MEDICINE | Admitting: INTERNAL MEDICINE
Payer: COMMERCIAL

## 2022-05-11 DIAGNOSIS — Z11.52 ENCOUNTER FOR SCREENING LABORATORY TESTING FOR SEVERE ACUTE RESPIRATORY SYNDROME CORONAVIRUS 2 (SARS-COV-2): ICD-10-CM

## 2022-05-11 DIAGNOSIS — Z87.891 PERSONAL HISTORY OF TOBACCO USE, PRESENTING HAZARDS TO HEALTH: ICD-10-CM

## 2022-05-11 DIAGNOSIS — J44.1 COPD EXACERBATION (H): ICD-10-CM

## 2022-05-11 PROBLEM — I25.10 CORONARY ARTERY DISEASE: Status: ACTIVE | Noted: 2022-05-11

## 2022-05-11 PROBLEM — I50.9 CONGESTIVE HEART FAILURE (H): Status: ACTIVE | Noted: 2022-05-11

## 2022-05-11 PROBLEM — F10.10 ALCOHOL ABUSE: Status: ACTIVE | Noted: 2022-05-11

## 2022-05-11 PROBLEM — I35.0 AORTIC STENOSIS: Status: ACTIVE | Noted: 2022-05-11

## 2022-05-11 PROBLEM — Z95.2 HEART VALVE TRANSPLANTED: Status: ACTIVE | Noted: 2022-05-11

## 2022-05-11 PROBLEM — J44.9 CHRONIC OBSTRUCTIVE PULMONARY DISEASE (H): Status: ACTIVE | Noted: 2022-05-11

## 2022-05-11 PROBLEM — E78.5 HYPERLIPIDEMIA: Status: ACTIVE | Noted: 2022-05-11

## 2022-05-11 PROBLEM — N40.0 BENIGN PROSTATIC HYPERPLASIA: Status: ACTIVE | Noted: 2022-05-11

## 2022-05-11 PROBLEM — I71.20 THORACIC AORTIC ANEURYSM WITHOUT RUPTURE (H): Status: ACTIVE | Noted: 2022-05-11

## 2022-05-11 PROBLEM — D69.6 DISORDER INVOLVING THROMBOCYTOPENIA (H): Status: ACTIVE | Noted: 2022-05-11

## 2022-05-11 PROBLEM — G47.33 OBSTRUCTIVE SLEEP APNEA SYNDROME: Status: ACTIVE | Noted: 2022-05-11

## 2022-05-11 LAB
ANION GAP SERPL CALCULATED.3IONS-SCNC: 5 MMOL/L (ref 3–14)
BASE EXCESS BLDV CALC-SCNC: 1.7 MMOL/L (ref -7.7–1.9)
BUN SERPL-MCNC: 10 MG/DL (ref 7–30)
CA-I BLD-MCNC: 4.8 MG/DL (ref 4.4–5.2)
CALCIUM SERPL-MCNC: 6.7 MG/DL (ref 8.5–10.1)
CHLORIDE BLD-SCNC: 114 MMOL/L (ref 94–109)
CO2 SERPL-SCNC: 24 MMOL/L (ref 20–32)
CREAT SERPL-MCNC: 0.73 MG/DL (ref 0.66–1.25)
D DIMER PPP FEU-MCNC: 0.94 UG/ML FEU (ref 0–0.5)
ERYTHROCYTE [DISTWIDTH] IN BLOOD BY AUTOMATED COUNT: 12.2 % (ref 10–15)
FLUAV RNA SPEC QL NAA+PROBE: NEGATIVE
FLUBV RNA RESP QL NAA+PROBE: NEGATIVE
GFR SERPL CREATININE-BSD FRML MDRD: >90 ML/MIN/1.73M2
GLUCOSE BLD-MCNC: 93 MG/DL (ref 70–99)
HCO3 BLDV-SCNC: 29 MMOL/L (ref 21–28)
HCT VFR BLD AUTO: 40.6 % (ref 40–53)
HGB BLD-MCNC: 13.8 G/DL (ref 13.3–17.7)
HOLD SPECIMEN: NORMAL
MAGNESIUM SERPL-MCNC: 1.6 MG/DL (ref 1.6–2.3)
MCH RBC QN AUTO: 34 PG (ref 26.5–33)
MCHC RBC AUTO-ENTMCNC: 34 G/DL (ref 31.5–36.5)
MCV RBC AUTO: 100 FL (ref 78–100)
NT-PROBNP SERPL-MCNC: 157 PG/ML (ref 0–900)
O2/TOTAL GAS SETTING VFR VENT: 24 %
PCO2 BLDV: 57 MM HG (ref 40–50)
PH BLDV: 7.32 [PH] (ref 7.32–7.43)
PHOSPHATE SERPL-MCNC: 2.9 MG/DL (ref 2.5–4.5)
PLATELET # BLD AUTO: 117 10E3/UL (ref 150–450)
PO2 BLDV: 31 MM HG (ref 25–47)
POTASSIUM BLD-SCNC: 3.5 MMOL/L (ref 3.4–5.3)
RBC # BLD AUTO: 4.06 10E6/UL (ref 4.4–5.9)
SARS-COV-2 RNA RESP QL NAA+PROBE: NEGATIVE
SODIUM SERPL-SCNC: 143 MMOL/L (ref 133–144)
WBC # BLD AUTO: 4.2 10E3/UL (ref 4–11)

## 2022-05-11 PROCEDURE — 96365 THER/PROPH/DIAG IV INF INIT: CPT | Mod: 59 | Performed by: EMERGENCY MEDICINE

## 2022-05-11 PROCEDURE — 96372 THER/PROPH/DIAG INJ SC/IM: CPT | Mod: XS | Performed by: PHYSICIAN ASSISTANT

## 2022-05-11 PROCEDURE — 250N000013 HC RX MED GY IP 250 OP 250 PS 637: Performed by: PHYSICIAN ASSISTANT

## 2022-05-11 PROCEDURE — 82803 BLOOD GASES ANY COMBINATION: CPT | Performed by: PHYSICIAN ASSISTANT

## 2022-05-11 PROCEDURE — 80048 BASIC METABOLIC PNL TOTAL CA: CPT | Performed by: EMERGENCY MEDICINE

## 2022-05-11 PROCEDURE — 85379 FIBRIN DEGRADATION QUANT: CPT | Performed by: EMERGENCY MEDICINE

## 2022-05-11 PROCEDURE — 93010 ELECTROCARDIOGRAM REPORT: CPT | Performed by: EMERGENCY MEDICINE

## 2022-05-11 PROCEDURE — 99285 EMERGENCY DEPT VISIT HI MDM: CPT | Mod: 25 | Performed by: EMERGENCY MEDICINE

## 2022-05-11 PROCEDURE — 93005 ELECTROCARDIOGRAM TRACING: CPT | Performed by: EMERGENCY MEDICINE

## 2022-05-11 PROCEDURE — 36415 COLL VENOUS BLD VENIPUNCTURE: CPT | Performed by: EMERGENCY MEDICINE

## 2022-05-11 PROCEDURE — 87636 SARSCOV2 & INF A&B AMP PRB: CPT | Performed by: EMERGENCY MEDICINE

## 2022-05-11 PROCEDURE — 250N000013 HC RX MED GY IP 250 OP 250 PS 637: Performed by: EMERGENCY MEDICINE

## 2022-05-11 PROCEDURE — 250N000012 HC RX MED GY IP 250 OP 636 PS 637: Performed by: EMERGENCY MEDICINE

## 2022-05-11 PROCEDURE — 85027 COMPLETE CBC AUTOMATED: CPT | Performed by: EMERGENCY MEDICINE

## 2022-05-11 PROCEDURE — 250N000011 HC RX IP 250 OP 636: Performed by: EMERGENCY MEDICINE

## 2022-05-11 PROCEDURE — 71045 X-RAY EXAM CHEST 1 VIEW: CPT

## 2022-05-11 PROCEDURE — 250N000011 HC RX IP 250 OP 636: Performed by: PHYSICIAN ASSISTANT

## 2022-05-11 PROCEDURE — 99223 1ST HOSP IP/OBS HIGH 75: CPT | Mod: AI | Performed by: PHYSICIAN ASSISTANT

## 2022-05-11 PROCEDURE — 82330 ASSAY OF CALCIUM: CPT | Performed by: PHYSICIAN ASSISTANT

## 2022-05-11 PROCEDURE — 83735 ASSAY OF MAGNESIUM: CPT | Performed by: EMERGENCY MEDICINE

## 2022-05-11 PROCEDURE — 250N000009 HC RX 250: Performed by: EMERGENCY MEDICINE

## 2022-05-11 PROCEDURE — 36415 COLL VENOUS BLD VENIPUNCTURE: CPT | Performed by: PHYSICIAN ASSISTANT

## 2022-05-11 PROCEDURE — 99291 CRITICAL CARE FIRST HOUR: CPT | Mod: 25 | Performed by: EMERGENCY MEDICINE

## 2022-05-11 PROCEDURE — 84100 ASSAY OF PHOSPHORUS: CPT | Performed by: EMERGENCY MEDICINE

## 2022-05-11 PROCEDURE — 83880 ASSAY OF NATRIURETIC PEPTIDE: CPT | Performed by: EMERGENCY MEDICINE

## 2022-05-11 PROCEDURE — C9803 HOPD COVID-19 SPEC COLLECT: HCPCS | Performed by: EMERGENCY MEDICINE

## 2022-05-11 PROCEDURE — 120N000001 HC R&B MED SURG/OB

## 2022-05-11 PROCEDURE — 71275 CT ANGIOGRAPHY CHEST: CPT

## 2022-05-11 PROCEDURE — 250N000009 HC RX 250: Performed by: PHYSICIAN ASSISTANT

## 2022-05-11 RX ORDER — AZITHROMYCIN 250 MG/1
250 TABLET, FILM COATED ORAL DAILY
Status: DISCONTINUED | OUTPATIENT
Start: 2022-05-12 | End: 2022-05-12 | Stop reason: HOSPADM

## 2022-05-11 RX ORDER — IOPAMIDOL 755 MG/ML
76 INJECTION, SOLUTION INTRAVASCULAR ONCE
Status: COMPLETED | OUTPATIENT
Start: 2022-05-11 | End: 2022-05-11

## 2022-05-11 RX ORDER — IPRATROPIUM BROMIDE AND ALBUTEROL SULFATE 2.5; .5 MG/3ML; MG/3ML
3 SOLUTION RESPIRATORY (INHALATION)
Status: DISCONTINUED | OUTPATIENT
Start: 2022-05-11 | End: 2022-05-12 | Stop reason: HOSPADM

## 2022-05-11 RX ORDER — OLANZAPINE 5 MG/1
5-10 TABLET, ORALLY DISINTEGRATING ORAL EVERY 6 HOURS PRN
Status: DISCONTINUED | OUTPATIENT
Start: 2022-05-11 | End: 2022-05-12 | Stop reason: HOSPADM

## 2022-05-11 RX ORDER — LORAZEPAM 2 MG/ML
1-2 INJECTION INTRAMUSCULAR EVERY 30 MIN PRN
Status: DISCONTINUED | OUTPATIENT
Start: 2022-05-11 | End: 2022-05-12 | Stop reason: HOSPADM

## 2022-05-11 RX ORDER — PROCHLORPERAZINE 25 MG
12.5 SUPPOSITORY, RECTAL RECTAL EVERY 12 HOURS PRN
Status: DISCONTINUED | OUTPATIENT
Start: 2022-05-11 | End: 2022-05-12 | Stop reason: HOSPADM

## 2022-05-11 RX ORDER — CARVEDILOL 25 MG/1
12.5 TABLET ORAL 2 TIMES DAILY WITH MEALS
COMMUNITY

## 2022-05-11 RX ORDER — ALBUTEROL SULFATE 5 MG/ML
2.5 SOLUTION RESPIRATORY (INHALATION)
Status: DISCONTINUED | OUTPATIENT
Start: 2022-05-11 | End: 2022-05-12 | Stop reason: HOSPADM

## 2022-05-11 RX ORDER — PREDNISONE 20 MG/1
40 TABLET ORAL ONCE
Status: COMPLETED | OUTPATIENT
Start: 2022-05-11 | End: 2022-05-11

## 2022-05-11 RX ORDER — ENOXAPARIN SODIUM 100 MG/ML
40 INJECTION SUBCUTANEOUS EVERY 24 HOURS
Status: DISCONTINUED | OUTPATIENT
Start: 2022-05-11 | End: 2022-05-12 | Stop reason: HOSPADM

## 2022-05-11 RX ORDER — FOLIC ACID 1 MG/1
1 TABLET ORAL DAILY
Status: DISCONTINUED | OUTPATIENT
Start: 2022-05-12 | End: 2022-05-12 | Stop reason: HOSPADM

## 2022-05-11 RX ORDER — ONDANSETRON 2 MG/ML
4 INJECTION INTRAMUSCULAR; INTRAVENOUS EVERY 6 HOURS PRN
Status: DISCONTINUED | OUTPATIENT
Start: 2022-05-11 | End: 2022-05-12 | Stop reason: HOSPADM

## 2022-05-11 RX ORDER — CALCIUM CARBONATE 500 MG/1
1000 TABLET, CHEWABLE ORAL ONCE
Status: COMPLETED | OUTPATIENT
Start: 2022-05-11 | End: 2022-05-11

## 2022-05-11 RX ORDER — ASPIRIN 81 MG/1
81 TABLET ORAL DAILY
Status: DISCONTINUED | OUTPATIENT
Start: 2022-05-12 | End: 2022-05-12 | Stop reason: HOSPADM

## 2022-05-11 RX ORDER — PROCHLORPERAZINE MALEATE 5 MG
5 TABLET ORAL EVERY 6 HOURS PRN
Status: DISCONTINUED | OUTPATIENT
Start: 2022-05-11 | End: 2022-05-12 | Stop reason: HOSPADM

## 2022-05-11 RX ORDER — PREDNISONE 20 MG/1
40 TABLET ORAL DAILY
Status: DISCONTINUED | OUTPATIENT
Start: 2022-05-12 | End: 2022-05-12 | Stop reason: HOSPADM

## 2022-05-11 RX ORDER — ONDANSETRON 4 MG/1
4 TABLET, ORALLY DISINTEGRATING ORAL EVERY 6 HOURS PRN
Status: DISCONTINUED | OUTPATIENT
Start: 2022-05-11 | End: 2022-05-12 | Stop reason: HOSPADM

## 2022-05-11 RX ORDER — FLUMAZENIL 0.1 MG/ML
0.2 INJECTION, SOLUTION INTRAVENOUS
Status: DISCONTINUED | OUTPATIENT
Start: 2022-05-11 | End: 2022-05-12 | Stop reason: HOSPADM

## 2022-05-11 RX ORDER — ACETAMINOPHEN 325 MG/1
650 TABLET ORAL EVERY 6 HOURS PRN
Status: DISCONTINUED | OUTPATIENT
Start: 2022-05-11 | End: 2022-05-12 | Stop reason: HOSPADM

## 2022-05-11 RX ORDER — PREDNISONE 20 MG/1
20 TABLET ORAL ONCE
Status: COMPLETED | OUTPATIENT
Start: 2022-05-11 | End: 2022-05-11

## 2022-05-11 RX ORDER — LORAZEPAM 1 MG/1
1-2 TABLET ORAL EVERY 30 MIN PRN
Status: DISCONTINUED | OUTPATIENT
Start: 2022-05-11 | End: 2022-05-12 | Stop reason: HOSPADM

## 2022-05-11 RX ORDER — LISINOPRIL 10 MG/1
0.5 TABLET ORAL DAILY
COMMUNITY
Start: 2021-10-21

## 2022-05-11 RX ORDER — AZITHROMYCIN 250 MG/1
500 TABLET, FILM COATED ORAL ONCE
Status: COMPLETED | OUTPATIENT
Start: 2022-05-11 | End: 2022-05-11

## 2022-05-11 RX ORDER — LIDOCAINE 40 MG/G
CREAM TOPICAL
Status: DISCONTINUED | OUTPATIENT
Start: 2022-05-11 | End: 2022-05-12 | Stop reason: HOSPADM

## 2022-05-11 RX ORDER — CARVEDILOL 12.5 MG/1
12.5 TABLET ORAL 2 TIMES DAILY WITH MEALS
Status: DISCONTINUED | OUTPATIENT
Start: 2022-05-11 | End: 2022-05-12 | Stop reason: HOSPADM

## 2022-05-11 RX ORDER — IPRATROPIUM BROMIDE AND ALBUTEROL SULFATE 2.5; .5 MG/3ML; MG/3ML
3 SOLUTION RESPIRATORY (INHALATION) ONCE
Status: DISCONTINUED | OUTPATIENT
Start: 2022-05-11 | End: 2022-05-11

## 2022-05-11 RX ORDER — LISINOPRIL 5 MG/1
5 TABLET ORAL DAILY
Status: DISCONTINUED | OUTPATIENT
Start: 2022-05-12 | End: 2022-05-12 | Stop reason: HOSPADM

## 2022-05-11 RX ORDER — MULTIPLE VITAMINS W/ MINERALS TAB 9MG-400MCG
1 TAB ORAL DAILY
Status: DISCONTINUED | OUTPATIENT
Start: 2022-05-12 | End: 2022-05-12 | Stop reason: HOSPADM

## 2022-05-11 RX ORDER — HALOPERIDOL 5 MG/ML
2.5-5 INJECTION INTRAMUSCULAR EVERY 6 HOURS PRN
Status: DISCONTINUED | OUTPATIENT
Start: 2022-05-11 | End: 2022-05-12 | Stop reason: HOSPADM

## 2022-05-11 RX ORDER — CEFTRIAXONE 1 G/1
1 INJECTION, POWDER, FOR SOLUTION INTRAMUSCULAR; INTRAVENOUS ONCE
Status: COMPLETED | OUTPATIENT
Start: 2022-05-11 | End: 2022-05-11

## 2022-05-11 RX ORDER — ACETAMINOPHEN 650 MG/1
650 SUPPOSITORY RECTAL EVERY 6 HOURS PRN
Status: DISCONTINUED | OUTPATIENT
Start: 2022-05-11 | End: 2022-05-12 | Stop reason: HOSPADM

## 2022-05-11 RX ORDER — IPRATROPIUM BROMIDE AND ALBUTEROL SULFATE 2.5; .5 MG/3ML; MG/3ML
3 SOLUTION RESPIRATORY (INHALATION) ONCE
Status: COMPLETED | OUTPATIENT
Start: 2022-05-11 | End: 2022-05-11

## 2022-05-11 RX ORDER — ALBUTEROL SULFATE 90 UG/1
2 AEROSOL, METERED RESPIRATORY (INHALATION) 4 TIMES DAILY PRN
COMMUNITY
Start: 2021-10-21

## 2022-05-11 RX ADMIN — ALBUTEROL SULFATE 2.5 MG: 5 SOLUTION RESPIRATORY (INHALATION) at 20:11

## 2022-05-11 RX ADMIN — PREDNISONE 20 MG: 20 TABLET ORAL at 19:08

## 2022-05-11 RX ADMIN — AZITHROMYCIN MONOHYDRATE 500 MG: 250 TABLET ORAL at 19:08

## 2022-05-11 RX ADMIN — SODIUM CHLORIDE 100 ML: 9 INJECTION, SOLUTION INTRAVENOUS at 19:32

## 2022-05-11 RX ADMIN — ACETAMINOPHEN 650 MG: 325 TABLET ORAL at 21:37

## 2022-05-11 RX ADMIN — CEFTRIAXONE 1 G: 1 INJECTION, POWDER, FOR SOLUTION INTRAMUSCULAR; INTRAVENOUS at 19:12

## 2022-05-11 RX ADMIN — ENOXAPARIN SODIUM 40 MG: 100 INJECTION SUBCUTANEOUS at 21:36

## 2022-05-11 RX ADMIN — CALCIUM CARBONATE (ANTACID) CHEW TAB 500 MG 1000 MG: 500 CHEW TAB at 19:07

## 2022-05-11 RX ADMIN — PREDNISONE 40 MG: 20 TABLET ORAL at 17:28

## 2022-05-11 RX ADMIN — IPRATROPIUM BROMIDE AND ALBUTEROL SULFATE 3 ML: .5; 3 SOLUTION RESPIRATORY (INHALATION) at 16:25

## 2022-05-11 RX ADMIN — IOPAMIDOL 76 ML: 755 INJECTION, SOLUTION INTRAVENOUS at 19:32

## 2022-05-11 RX ADMIN — Medication 1 MG: at 21:37

## 2022-05-11 RX ADMIN — Medication 5 MG: at 21:37

## 2022-05-11 ASSESSMENT — ACTIVITIES OF DAILY LIVING (ADL)
HEARING_DIFFICULTY_OR_DEAF: NO
TOILETING_ISSUES: NO
DOING_ERRANDS_INDEPENDENTLY_DIFFICULTY: NO
DIFFICULTY_COMMUNICATING: NO
WALKING_OR_CLIMBING_STAIRS_DIFFICULTY: NO
DRESSING/BATHING_DIFFICULTY: NO
FALL_HISTORY_WITHIN_LAST_SIX_MONTHS: NO
DIFFICULTY_EATING/SWALLOWING: NO
CONCENTRATING,_REMEMBERING_OR_MAKING_DECISIONS_DIFFICULTY: NO
WEAR_GLASSES_OR_BLIND: NO
ADLS_ACUITY_SCORE: 18
ADLS_ACUITY_SCORE: 35
CHANGE_IN_FUNCTIONAL_STATUS_SINCE_ONSET_OF_CURRENT_ILLNESS/INJURY: NO

## 2022-05-11 ASSESSMENT — ENCOUNTER SYMPTOMS
SHORTNESS OF BREATH: 1
CHILLS: 0
COUGH: 1
FEVER: 0

## 2022-05-11 NOTE — ED NOTES
Patient restless with audible wheezes and nonproductive cough upon arrival. 4L NC applied and sats improved from 88-97% quickly. No cough medicines taken. Cough started yesterday and has worsened. Covid swab and labs sent. Duoneb given.

## 2022-05-11 NOTE — ED TRIAGE NOTES
Pt brought back to ED room right away to triage due to SOA, unable talk in full sentences and frequent cough.  Pt not on oxygen at home.       Triage Assessment     Row Name 05/11/22 [id number]       Triage Assessment (Adult)    Airway WDL airway symptoms;X       Respiratory WDL    Respiratory WDL X;cough    Cough Frequency frequent

## 2022-05-11 NOTE — ED PROVIDER NOTES
History     Chief Complaint   Patient presents with     Shortness of Breath     HPI  Sunny Inman is a 76 year old male who presents with cough and shortness of breath.  No fevers.  The patient does take inhalers at home.  He is a former smoker.  He is not sure if he has COPD.  His symptoms started last night.  He says he feels like he cannot breathe and he is coughing. No chest pain.     Arrival sat on RA 88%.     Allergies:  Allergies   Allergen Reactions     Nkda [No Known Drug Allergies]        Problem List:    Patient Active Problem List    Diagnosis Date Noted     Thoracic aortic aneurysm without rupture (H) 05/11/2022     Priority: Medium     Nov 17, 2015 Entered By: MABEL HOLDER Comment: noted on chest CT, follow up 11/2016 with thoracic CTA       Obstructive sleep apnea syndrome 05/11/2022     Priority: Medium     Oct 08, 2015 Entered By: MABEL HOLDER Comment: AHI 21       Hyperlipidemia 05/11/2022     Priority: Medium     Heart valve transplanted 05/11/2022     Priority: Medium     Oct 14, 2019 Entered By: UMANG CARRASCO Comment: aortic       Former smoker 05/11/2022     Priority: Medium     Disorder involving thrombocytopenia (H) 05/11/2022     Priority: Medium     Coronary artery disease 05/11/2022     Priority: Medium     Congestive heart failure (H) 05/11/2022     Priority: Medium     May 12, 2017 Entered By: BRITTNEY LIMA Comment: Enrolled in CHF clinic. Sandra Ibarra, RN Case Manager. Call ext [phone] for appt.       Chronic obstructive pulmonary disease (H) 05/11/2022     Priority: Medium     Feb 24, 2011 Entered By: MABEL HOLDER Comment: Spirometry deomonstrates moderate obstruction       Benign prostatic hyperplasia 05/11/2022     Priority: Medium     Aortic stenosis 05/11/2022     Priority: Medium     May 18, 2016 Entered By: MABEL HOLDER Comment: dyspnea, echo: EF 35%, severe aortic stenosis       Alcohol abuse 05/11/2022     Priority: Medium     COPD exacerbation (H) 05/11/2022      Priority: Medium     Senile nuclear sclerosis 11/13/2012     Priority: Medium        Past Medical History:    Past Medical History:   Diagnosis Date     Chronic obstructive pulmonary disease (H) 5/11/2022     Heart disease      Hypertension      Obstructive sleep apnea syndrome 5/11/2022     Sleep apnea        Past Surgical History:    Past Surgical History:   Procedure Laterality Date     ARTHROSCOPY SHOULDER ROTATOR CUFF REPAIR Left 3/27/2018    Procedure: ARTHROSCOPY SHOULDER ROTATOR CUFF REPAIR;  Left Shoulder Arthroscopic Subacromial Decompression, Distal Clavicle Excision;  Surgeon: Alex Pacheco MD;  Location: [facility]     PHACOEMULSIFICATION WITH STANDARD INTRAOCULAR LENS IMPLANT  11/15/2012    Procedure: PHACOEMULSIFICATION WITH STANDARD INTRAOCULAR LENS IMPLANT;  Right kelman phacoemulsification with intraocular lens implant;  Surgeon: Aaron Salas MD;  Location: [facility]     PHACOEMULSIFICATION WITH STANDARD INTRAOCULAR LENS IMPLANT  11/29/2012    Procedure: PHACOEMULSIFICATION WITH STANDARD INTRAOCULAR LENS IMPLANT;  Left Kelman phacoemulsification with intraocular lens implant;  Surgeon: Aaron Salas MD;  Location: [facility]       Family History:    Family History   Problem Relation Age of Onset     Heart Disease Father      Heart Surgery Father        Social History:  Marital Status:   [2]  Social History     Tobacco Use     Smoking status: Former Smoker     Packs/day: 1.00     Years: 40.00     Pack years: 40.00     Tobacco comment: Quit 8 years ago   Substance Use Topics     Alcohol use: Yes     Comment: average of 6 beers per day     Drug use: Not Currently        Medications:    No current outpatient medications on file.        Review of Systems   Constitutional: Negative for chills and fever.   Respiratory: Positive for cough and shortness of breath.    Cardiovascular: Negative for chest pain.   Allergic/Immunologic: Negative for immunocompromised state.   All other systems  reviewed and are negative.      Physical Exam   BP: (!) 195/129 (right ankle)  Pulse: 79  Temp: 97.6  F (36.4  C)  Resp: 25  Height: 182.9 cm (6')  Weight: 79.4 kg (175 lb)  SpO2: 97 %      Physical Exam  Constitutional:       General: He is in acute distress.      Appearance: He is ill-appearing.   HENT:      Head: Normocephalic and atraumatic.      Right Ear: External ear normal.      Left Ear: External ear normal.      Nose: No congestion.      Mouth/Throat:      Mouth: Mucous membranes are moist.   Eyes:      Extraocular Movements: Extraocular movements intact.   Cardiovascular:      Rate and Rhythm: Normal rate.      Comments: Midline surgical scar on chest, well healed  Pulmonary:      Effort: Respiratory distress present.      Comments: Diffuse wheezing, some rhonchi  Abdominal:      General: Abdomen is flat. There is no distension.   Musculoskeletal:         General: No swelling.      Cervical back: No rigidity.   Skin:     Capillary Refill: Capillary refill takes less than 2 seconds.      Coloration: Skin is not jaundiced.   Neurological:      General: No focal deficit present.      Mental Status: He is alert.   Psychiatric:      Comments: Very nice         ED Course              ED Course as of 05/11/22 2122   Wed May 11, 2022   1708 BNP wnl. Doubt heart failure. No wbc elevation. Calcium slightly low at 6.7. not anemic. Negative covid, negative flu.    1711 CXR shows:  IMPRESSION: AP views of the chest were obtained. Postsurgical changes  of cardiac surgery with median sternotomy wires. Cardiomediastinal  silhouette is within normal limits. A few small radiodensities project  over the lung bases, likely represent calcified granulomas. No  significant pleural effusion or pneumothorax. Small nodular opacity  projects over the lateral aspect of the right midlung, likely  represent nipple shadow, which can be confirmed by repeating the x-ray  with nipple marker.   1734 On 6 L nc. Just got prednisone now. Will  look for a bed.    1735 Will start antibiotics for CAP/COPD.    1735 Daughter at bedside. Says patient has COPD. Long smoking history. No baseline o2 use.    1749 Will give tums for low calcium. Daughter and patient deny history of calcium issues.    1803 [facility] a [facility].    1805 Second daughter is here. Asking for a d-dimer. Will order. Patient denies leg swelling.    1811 Phos is 2.9.    1826 D-dimer is 0.94. doubt PE but will get CTPA.    1846 Updated patient   1847 Will give additional prednisone. Breathing somewhat improved.    1847 [facility] to [facility] here.    1936 Signed out to hospitalist team. Will assess patient and determine if patient should go to ICU or floor.      Procedures              EKG Interpretation:      Interpreted by Marco Hsieh MD  Time reviewed: 425  Symptoms at time of EKG: SOB   Rhythm: normal sinus   Rate: Normal  Axis: Normal  Ectopy: none  Conduction: 1st degree AV block, partial RBBB  ST Segments/ T Waves: No acute ischemic changes  Q Waves: nonspecific  Comparison to prior: No old EKG available    Clinical Impression: non-specific EKG                Critical Care time:  was 32 minutes for this patient excluding procedures.         Results for orders placed or performed during the hospital encounter of 05/11/22 (from the past 24 hour(s))   [place] Draw    Narrative    The following orders were created for panel order [place] Draw.  Procedure                               Abnormality         Status                     ---------                               -----------         ------                     Extra Blue Top Tube[[id number]]                              Final result               Extra Red Top Tube[[id number]]                               Final result               Extra Green Top (Lithium...[[id number]]                      Final result               Extra Purple Top Tube[[id number]]                            Final result               Extra Heparinized  Syringe[[id number]]                        Final result               Extra Green Top (Lithium...[[id number]]                      Final result                 Please view results for these tests on the individual orders.   Extra Blue Top Tube   Result Value Ref Range    Hold Specimen JIC    Extra Red Top Tube   Result Value Ref Range    Hold Specimen JIC    Extra Green Top (Lithium Heparin) Tube   Result Value Ref Range    Hold Specimen JIC    Extra Purple Top Tube   Result Value Ref Range    Hold Specimen JIC    Extra Heparinized Syringe   Result Value Ref Range    Hold Specimen JIC    Extra Green Top (Lithium Heparin) ON ICE   Result Value Ref Range    Hold Specimen JIC    CBC with platelets   Result Value Ref Range    WBC Count 4.2 4.0 - 11.0 10e3/uL    RBC Count 4.06 (L) 4.40 - 5.90 10e6/uL    Hemoglobin 13.8 13.3 - 17.7 g/dL    Hematocrit 40.6 40.0 - 53.0 %     78 - 100 fL    MCH 34.0 (H) 26.5 - 33.0 pg    MCHC 34.0 31.5 - 36.5 g/dL    RDW 12.2 10.0 - 15.0 %    Platelet Count 117 (L) 150 - 450 10e3/uL   Basic metabolic panel   Result Value Ref Range    Sodium 143 133 - 144 mmol/L    Potassium 3.5 3.4 - 5.3 mmol/L    Chloride 114 (H) 94 - 109 mmol/L    Carbon Dioxide (CO2) 24 20 - 32 mmol/L    Anion Gap 5 3 - 14 mmol/L    Urea Nitrogen 10 7 - 30 mg/dL    Creatinine 0.73 0.66 - 1.25 mg/dL    Calcium 6.7 (L) 8.5 - 10.1 mg/dL    Glucose 93 70 - 99 mg/dL    GFR Estimate >90 >60 mL/min/1.73m2   NT pro BNP   Result Value Ref Range    N terminal Pro BNP Inpatient 157 0 - 900 pg/mL   Phosphorus   Result Value Ref Range    Phosphorus 2.9 2.5 - 4.5 mg/dL   Magnesium   Result Value Ref Range    Magnesium 1.6 1.6 - 2.3 mg/dL   D dimer quantitative   Result Value Ref Range    D-Dimer Quantitative 0.94 (H) 0.00 - 0.50 ug/mL FEU    Narrative    This D-dimer assay is intended for use in conjunction with a clinical pretest probability assessment model to exclude pulmonary embolism (PE) and deep venous thrombosis (DVT) in  outpatients suspected of PE or DVT. The cut-off value is 0.50 ug/mL FEU.   Symptomatic; Yes; 5/10/2022 Influenza A/B & SARS-CoV2 (COVID-19) Virus PCR Multiplex Nasopharyngeal    Specimen: Nasopharyngeal; Swab   Result Value Ref Range    Influenza A PCR Negative Negative    Influenza B PCR Negative Negative    SARS CoV2 PCR Negative Negative    Narrative    Testing was performed using the dasha SARS-CoV-2 & Influenza A/B Assay on the dasha [employer] System. This test should be ordered for the detection of SARS-CoV-2 and influenza viruses in individuals who meet clinical and/or epidemiological criteria. Test performance is unknown in asymptomatic patients. This test is for in vitro diagnostic use under the FDA EUA for laboratories certified under CLIA to perform moderate and/or high complexity testing. This test has not been FDA cleared or approved. A negative result does not rule out the presence of PCR inhibitors in the specimen or target RNA in concentration below the limit of detection for the assay. If only one viral target is positive but coinfection with multiple targets is suspected, the sample should be re-tested with another FDA cleared, approved or authorized test, if coinfection would change clinical management. [facility] are certified under the Clinical Laboratory Improvement Amendments of 1988 (CLIA-88) as  qualified to perform moderate and/or high complexity laboratory testing.   XR Chest Port 1 View    Narrative    CHEST PORTABLE ONE VIEW   5/11/2022 4:50 PM     HISTORY: Wheezing, SOB.    COMPARISON: None available.      Impression    IMPRESSION: AP views of the chest were obtained. Postsurgical changes  of cardiac surgery with median sternotomy wires. Cardiomediastinal  silhouette is within normal limits. A few small radiodensities project  over the lung bases, likely represent calcified granulomas. No  significant pleural effusion or pneumothorax. Small nodular opacity  projects over  the lateral aspect of the right midlung, likely  represent nipple shadow, which can be confirmed by repeating the x-ray  with nipple marker.    TL TIWARI MD         SYSTEM ID:  [id number]   CT Chest Pulmonary Embolism w Contrast    Narrative    EXAM: CT CHEST PULMONARY EMBOLISM W CONTRAST  LOCATION: [facility]  DATE/TIME: 5/11/2022 7:31 PM    INDICATION: PE suspected, low/intermediate prob, positive D-dimer.  COMPARISON: None.  TECHNIQUE: CT chest pulmonary angiogram during arterial phase injection of IV contrast. Multiplanar reformats and MIP reconstructions were performed. Dose reduction techniques were used.   CONTRAST: 76 ml Isovue 370.    FINDINGS:  ANGIOGRAM CHEST: Pulmonary arteries are normal caliber and negative for pulmonary emboli. 4.9 x 4.4 cm ascending thoracic aneurysm. No CT evidence of right heart strain.    LUNGS AND PLEURA: Moderate emphysema. Simple right lower lobe calcification. No pneumothorax or pleural effusion.    MEDIASTINUM/AXILLAE: Normal size heart. No pericardial effusion. No hilar or mediastinal lymphadenopathy. Calcified hilar lymph nodes. Prosthetic aortic valve.    CORONARY ARTERY CALCIFICATION: Severe.    UPPER ABDOMEN: Scattered calcified granulomata of the liver and spleen.    MUSCULOSKELETAL: Diffuse osteopenia. Mild multilevel discogenic degenerative change.      Impression    IMPRESSION:  1.  No pulmonary embolus.  2.  4.9 x 4.4 cm ascending thoracic aortic aneurysm with aortic valve replacement.  3.  Coronary artery disease and atherosclerotic vascular disease.  4.  Emphysema.  5.  Ancient granulomatous disease/infection.   Ionized Calcium   Result Value Ref Range    Calcium Ionized 4.8 4.4 - 5.2 mg/dL   Blood gas venous   Result Value Ref Range    pH Venous 7.32 7.32 - 7.43    pCO2 Venous 57 (H) 40 - 50 mm Hg    pO2 Venous 31 25 - 47 mm Hg    Bicarbonate Venous 29 (H) 21 - 28 mmol/L    Base Excess/Deficit (+/-) 1.7 -7.7 - 1.9 mmol/L     FIO2 24        Medications   ipratropium - albuterol 0.5 mg/2.5 mg/3 mL (DUONEB) neb solution 3 mL (has no administration in time range)   albuterol (PROVENTIL) neb solution 2.5 mg (2.5 mg Nebulization Given 5/11/22 2011)   aspirin EC tablet 81 mg (has no administration in time range)   lisinopril (ZESTRIL) tablet 5 mg (has no administration in time range)   lidocaine 1 % 0.1-1 mL (has no administration in time range)   lidocaine (LMX4) kit (has no administration in time range)   sodium chloride (PF) 0.9% PF flush 3 mL (has no administration in time range)   sodium chloride (PF) 0.9% PF flush 3 mL (has no administration in time range)   melatonin tablet 1 mg (has no administration in time range)   enoxaparin ANTICOAGULANT (LOVENOX) injection 40 mg (has no administration in time range)   acetaminophen (TYLENOL) tablet 650 mg (has no administration in time range)     Or   acetaminophen (TYLENOL) Suppository 650 mg (has no administration in time range)   ondansetron (ZOFRAN ODT) ODT tab 4 mg (has no administration in time range)     Or   ondansetron (ZOFRAN) injection 4 mg (has no administration in time range)   prochlorperazine (COMPAZINE) injection 5 mg (has no administration in time range)     Or   prochlorperazine (COMPAZINE) tablet 5 mg (has no administration in time range)     Or   prochlorperazine (COMPAZINE) suppository 12.5 mg (has no administration in time range)   OLANZapine zydis (zyPREXA) ODT tab 5-10 mg (has no administration in time range)     Or   haloperidol lactate (HALDOL) injection 2.5-5 mg (has no administration in time range)   flumazenil (ROMAZICON) injection 0.2 mg (has no administration in time range)   melatonin tablet 5 mg (has no administration in time range)   LORazepam (ATIVAN) tablet 1-2 mg (has no administration in time range)     Or   LORazepam (ATIVAN) injection 1-2 mg (has no administration in time range)   thiamine (B-1) tablet 100 mg (has no administration in time range)   folic  acid (FOLVITE) tablet 1 mg (has no administration in time range)   multivitamin w/minerals (THERA-VIT-M) tablet 1 tablet (has no administration in time range)   azithromycin (ZITHROMAX) tablet 250 mg (has no administration in time range)   predniSONE (DELTASONE) tablet 40 mg (has no administration in time range)   predniSONE (DELTASONE) tablet 40 mg (40 mg Oral Given 5/11/22 1728)   ipratropium - albuterol 0.5 mg/2.5 mg/3 mL (DUONEB) neb solution 3 mL (3 mLs Nebulization Given 5/11/22 1625)   cefTRIAXone (ROCEPHIN) 1 g vial to attach to  mL bag for ADULTS or NS 50 mL bag for PEDS (0 g Intravenous Stopped 5/11/22 1950)   azithromycin (ZITHROMAX) tablet 500 mg (500 mg Oral Given 5/11/22 1908)   calcium carbonate (TUMS) chewable tablet 1,000 mg (1,000 mg Oral Given 5/11/22 1907)   iopamidol (ISOVUE-370) solution 76 mL (76 mLs Intravenous Given 5/11/22 1932)   sodium chloride 0.9 % bag 500mL for CT scan flush use (100 mLs Intravenous Given 5/11/22 1932)   predniSONE (DELTASONE) tablet 20 mg (20 mg Oral Given 5/11/22 1908)       Assessments & Plan (with Medical Decision Making)       Favor pulmonary etiology.  Favor COPD.  Will check COVID, flu, basic labs.  Will get chest x-ray.  Will give duo nebs.  Will give prednisone.  Will monitor closely.  Patient currently on oxygen in the room but will monitor for clinical improvement.  Doubt cardiac etiology.    I have reviewed the nursing notes.    I have reviewed the findings, diagnosis, plan and need for follow up with the patient.  This note was in part created using dictation software in an effort to speed and improve patient care; any typos should be read with the frequent shortcomings of this technology in mind.       Critical Care Addendum    My initial assessment, based on my review of nursing observations, review of vital signs, focused history and physical exam, established that Sunny Inman has respiratory insufficiency, which requires immediate intervention,  and therefore he is critically ill.     After the initial assessment, the care team initiated multiple lab tests, initiated medication therapy with steroids, nebulizers, and antibiotics, initiated intensive non-invasive respiratory support and consulted with internal medicine to provide stabilization care. Due to the critical nature of this patient, I reassessed nursing observations, vital signs, physical exam, mental status and respiratory status multiple times prior to his disposition.     Time also spent performing documentation, discussion with family to obtain medical information for decision making, reviewing test results, discussion with consultants and coordination of care.     Critical care time (excluding teaching time and procedures): 32 minutes.     Current Discharge Medication List          Final diagnoses:   COPD exacerbation (H)       5/11/2022   [facility] EMERGENCY DEPT     Marco Hsieh MD  05/11/22 3524

## 2022-05-12 VITALS
BODY MASS INDEX: 22.99 KG/M2 | SYSTOLIC BLOOD PRESSURE: 113 MMHG | TEMPERATURE: 97.6 F | RESPIRATION RATE: 18 BRPM | WEIGHT: 169.75 LBS | DIASTOLIC BLOOD PRESSURE: 61 MMHG | HEART RATE: 67 BPM | OXYGEN SATURATION: 94 % | HEIGHT: 72 IN

## 2022-05-12 PROBLEM — Z11.52 ENCOUNTER FOR SCREENING LABORATORY TESTING FOR SEVERE ACUTE RESPIRATORY SYNDROME CORONAVIRUS 2 (SARS-COV-2): Status: ACTIVE | Noted: 2022-05-12

## 2022-05-12 PROBLEM — Z87.891 PERSONAL HISTORY OF TOBACCO USE, PRESENTING HAZARDS TO HEALTH: Status: ACTIVE | Noted: 2022-05-12

## 2022-05-12 LAB
ANION GAP SERPL CALCULATED.3IONS-SCNC: 6 MMOL/L (ref 3–14)
BUN SERPL-MCNC: 16 MG/DL (ref 7–30)
CALCIUM SERPL-MCNC: 8.3 MG/DL (ref 8.5–10.1)
CHLORIDE BLD-SCNC: 103 MMOL/L (ref 94–109)
CO2 SERPL-SCNC: 27 MMOL/L (ref 20–32)
CREAT SERPL-MCNC: 0.73 MG/DL (ref 0.66–1.25)
ERYTHROCYTE [DISTWIDTH] IN BLOOD BY AUTOMATED COUNT: 12.1 % (ref 10–15)
GFR SERPL CREATININE-BSD FRML MDRD: >90 ML/MIN/1.73M2
GLUCOSE BLD-MCNC: 141 MG/DL (ref 70–99)
HCT VFR BLD AUTO: 41.4 % (ref 40–53)
HGB BLD-MCNC: 14.3 G/DL (ref 13.3–17.7)
MCH RBC QN AUTO: 33.9 PG (ref 26.5–33)
MCHC RBC AUTO-ENTMCNC: 34.5 G/DL (ref 31.5–36.5)
MCV RBC AUTO: 98 FL (ref 78–100)
PLATELET # BLD AUTO: 108 10E3/UL (ref 150–450)
POTASSIUM BLD-SCNC: 4.2 MMOL/L (ref 3.4–5.3)
RBC # BLD AUTO: 4.22 10E6/UL (ref 4.4–5.9)
SODIUM SERPL-SCNC: 136 MMOL/L (ref 133–144)
WBC # BLD AUTO: 3.1 10E3/UL (ref 4–11)

## 2022-05-12 PROCEDURE — 999N000105 HC STATISTIC NO DOCUMENTATION TO SUPPORT CHARGE

## 2022-05-12 PROCEDURE — 99217 PR OBSERVATION CARE DISCHARGE: CPT | Performed by: INTERNAL MEDICINE

## 2022-05-12 PROCEDURE — 250N000009 HC RX 250: Performed by: PHYSICIAN ASSISTANT

## 2022-05-12 PROCEDURE — 250N000012 HC RX MED GY IP 250 OP 636 PS 637: Performed by: PHYSICIAN ASSISTANT

## 2022-05-12 PROCEDURE — 82310 ASSAY OF CALCIUM: CPT | Performed by: PHYSICIAN ASSISTANT

## 2022-05-12 PROCEDURE — 36415 COLL VENOUS BLD VENIPUNCTURE: CPT | Performed by: PHYSICIAN ASSISTANT

## 2022-05-12 PROCEDURE — 250N000013 HC RX MED GY IP 250 OP 250 PS 637: Performed by: PHYSICIAN ASSISTANT

## 2022-05-12 PROCEDURE — G0378 HOSPITAL OBSERVATION PER HR: HCPCS

## 2022-05-12 PROCEDURE — 85027 COMPLETE CBC AUTOMATED: CPT | Performed by: PHYSICIAN ASSISTANT

## 2022-05-12 RX ORDER — AZITHROMYCIN 250 MG/1
250 TABLET, FILM COATED ORAL DAILY
Qty: 4 TABLET | Refills: 0 | Status: SHIPPED | OUTPATIENT
Start: 2022-05-12 | End: 2022-05-16

## 2022-05-12 RX ORDER — PREDNISONE 20 MG/1
40 TABLET ORAL DAILY
Qty: 6 TABLET | Refills: 0 | Status: SHIPPED | OUTPATIENT
Start: 2022-05-13 | End: 2022-05-16

## 2022-05-12 RX ADMIN — PREDNISONE 40 MG: 20 TABLET ORAL at 08:45

## 2022-05-12 RX ADMIN — LISINOPRIL 5 MG: 5 TABLET ORAL at 08:46

## 2022-05-12 RX ADMIN — THIAMINE HCL TAB 100 MG 100 MG: 100 TAB at 08:46

## 2022-05-12 RX ADMIN — ASPIRIN 81 MG: 81 TABLET, DELAYED RELEASE ORAL at 08:46

## 2022-05-12 RX ADMIN — IPRATROPIUM BROMIDE AND ALBUTEROL SULFATE 3 ML: 2.5; .5 SOLUTION RESPIRATORY (INHALATION) at 06:47

## 2022-05-12 RX ADMIN — IPRATROPIUM BROMIDE AND ALBUTEROL SULFATE 3 ML: 2.5; .5 SOLUTION RESPIRATORY (INHALATION) at 12:21

## 2022-05-12 RX ADMIN — MULTIPLE VITAMINS W/ MINERALS TAB 1 TABLET: TAB at 08:45

## 2022-05-12 RX ADMIN — CARVEDILOL 12.5 MG: 12.5 TABLET, FILM COATED ORAL at 08:45

## 2022-05-12 RX ADMIN — FOLIC ACID 1 MG: 1 TABLET ORAL at 08:46

## 2022-05-12 ASSESSMENT — ACTIVITIES OF DAILY LIVING (ADL)
ADLS_ACUITY_SCORE: 18

## 2022-05-12 NOTE — PROGRESS NOTES
Pt walked in hallway without oxygen.  Lowest saturation got was 88% then back up to mid 90s.  Will notify Dr Ribeiro

## 2022-05-12 NOTE — H&P
[facility]    History and Physical - Hospitalist Service       Date of Admission:  5/11/2022    Assessment & Plan      Sunny Inman is a 76 year old male admitted on 5/11/2022. He has history of COPD, CAD s/p bypass, CHF, aortic stenosis s/p bioprosthetic valve replacement, thrombocytopenia and at risk alcohol use. He presents with cough and dyspnea x 1 day.    Acute COPD Exacerbation with acute hypoxic respiratory failure, likely chronic hypercapnia   1 day history of shortness of breath, productive cough, and new oxygen demands. Oxygenation of 88% on arrival with increased work of breathing, initially on 4 LPM though weaned down to 1-2 LPM on admission. VBG shows compensated respiratory acidosis with pH 7.32, CO2 57, bicarb 29. History of COPD, does not recall exacerbations. No PFTs on file. Appears to be COPD exacerbation with mild hypoxia. Received 60 mg prednisone, duonebs and antibiotics in the ED.  - nebulizers: scheduled duonebs Q4 while awake, albuterol prn  - steroids: prednisone 40 mg daily  - Antibiotics: azithromycin   - flutter valve ordered to help clear mucus  - titrate oxygen to O2 Saturation > 89%  - AM CBC, BMP  ADDENDUM: Reassessed at 10PM, breathing had improved following additional nebulizer treatments, less work of breathing. No longer tripoding.     Chronic Systolic Heart Failure,   Appears compensated. Echo not available though problem list notes EF 35%, unclear date or current EF.   - Continue home carvedilol, lisinopril   - Follow daily weights  - Judicious use of IVF    Coronary Artery Disease  Hyperlipidemia  Previously diagnosed. History of CABG about 5-6 years ago. Of note, patient was taking both doses of his carvedilol in the morning because he was forgetting to take the evening dose (reportedly he had been told to do this). Encouraged patient to again discuss with his PCP as this should be taken twice daily. Ordered for BID dosing while hospitalized and  discussed importance of this schedule.  - continue home ASA, beta blocker, ACEi  - not on statin, defer to outpatient    Aortic Stenosis s/p bioprosthetic valve replacement  History of bioprosthetic valve replacement.   - continue outpatient surveillance     Thrombocytopenia  Platelets 117 on presentation. Thrombocytopenia noted in problem list though unclear baseline, no prior records.  - AM CBC     At risk Alcohol use  Reports drinking 6 beers/day on average. Denies history of withdrawal in the past.  - CIWA protocol in place with PRN ativan  - daily thiamine, folic acid, multivitamin therapy initiated  - monitor CBC, electrolytes, CMP  - encouraged cessation, care transition consult placed to help with resources       Diet: Regular Diet Adult  DVT Prophylaxis: Enoxaparin (Lovenox) SQ  Diaz Catheter: Not present  Central Lines: None  Cardiac Monitoring: None  Code Status: Full Code , discussed directly on admission    Disposition Plan   Expected Discharge:  2-4 days   Anticipated discharge location:  Awaiting care coordination huddle  Delays:     respiratory status        The patient's care was discussed with the Attending Physician, Dr. Eladio Ribeiro and Patient.    Kika Bruno PA-C  Hospitalist Service  [facility]  Securely message with the Vocera Web Console (learn more here)  Text page via enavu Paging/Directory   ______________________________________________________________________    Chief Complaint   Cough, shortness of breath    History is obtained from the patient, ED provider    History of Present Illness   Sunny Inman is a 76 year old male who presents due to cough and dyspnea.    Yesterday he developed a productive cough and mild shortness of breath. His cough is productive of thick sputum. Overnight his breathing worsened and so he presented to the emergency department. He feels better when he leans forward across the dinner tray (tripod position). He was unsure  if he had a history of COPD though his daughter later came in and confirmed this. He does use inhalers at home. He does have a history of CHF though denies recent weight gain or lower extremity edema. He does not recall a prior COPD exacerbation though does report heavy smoking for at least 40 years. He feels somewhat better after nebulizer and steroids in the ED.     He reports some chest discomfort which is sharp and lasts less than a second. This is not new for him. At times it is associated with coughing.     He denies fevers, chills, myalgias, lightheadedness, dizziness, change in baseline congestion, palpitations, abdominal pain, nausea, emesis, diarrhea or urinary changes.    Review of Systems    The 10 point Review of Systems is negative other than noted in the HPI or here.     Past Medical History    I have reviewed this patient's medical history and updated it with pertinent information if needed.   Past Medical History:   Diagnosis Date     Chronic obstructive pulmonary disease (H) 5/11/2022     Heart disease      Hypertension      Obstructive sleep apnea syndrome 5/11/2022     Sleep apnea     CPAP     Past Surgical History   I have reviewed this patient's surgical history and updated it with pertinent information if needed.  Past Surgical History:   Procedure Laterality Date     ARTHROSCOPY SHOULDER ROTATOR CUFF REPAIR Left 3/27/2018    Procedure: ARTHROSCOPY SHOULDER ROTATOR CUFF REPAIR;  Left Shoulder Arthroscopic Subacromial Decompression, Distal Clavicle Excision;  Surgeon: Alex Pacheco MD;  Location: [facility] OR     PHACOEMULSIFICATION WITH STANDARD INTRAOCULAR LENS IMPLANT  11/15/2012    Procedure: PHACOEMULSIFICATION WITH STANDARD INTRAOCULAR LENS IMPLANT;  Right kelman phacoemulsification with intraocular lens implant;  Surgeon: Aaron Salas MD;  Location: [facility] OR     PHACOEMULSIFICATION WITH STANDARD INTRAOCULAR LENS IMPLANT  11/29/2012    Procedure: PHACOEMULSIFICATION WITH STANDARD  INTRAOCULAR LENS IMPLANT;  Left Kelman phacoemulsification with intraocular lens implant;  Surgeon: Aaron Salas MD;  Location: [facility] OR     Social History   I have reviewed this patient's social history and updated it with pertinent information if needed.  Social History     Tobacco Use     Smoking status: Former Smoker     Packs/day: 1.00     Years: 40.00     Pack years: 40.00     Tobacco comment: Quit 8 years ago   Substance Use Topics     Alcohol use: Yes     Comment: average of 6 beers per day     Drug use: Not Currently     Family History   I have reviewed this patient's family history and updated it with pertinent information if needed.  Family History   Problem Relation Age of Onset     Heart Disease Father      Heart Surgery Father      Prior to Admission Medications   Prior to Admission Medications   Prescriptions Last Dose Informant Patient Reported? Taking?   ASPIRIN PO 5/11/2022 at am Self Yes Yes   Sig: Take 81 mg by mouth   albuterol (PROAIR HFA/PROVENTIL HFA/VENTOLIN HFA) 108 (90 Base) MCG/ACT inhaler 5/11/2022 at am Self Yes Yes   carvedilol (COREG) 25 MG tablet 5/11/2022 at am Self Yes Yes   Sig: Take 25 mg by mouth 2 times daily (with meals)   lisinopril (ZESTRIL) 10 MG tablet 5/11/2022 at am Self Yes Yes   Sig: Take 0.5 tablets by mouth daily   tiotropium (SPIRIVA RESPIMAT) 2.5 MCG/ACT inhaler 5/11/2022 at am Self Yes Yes   Sig: Inhale 2 puffs into the lungs daily      Facility-Administered Medications: None     Allergies   Allergies   Allergen Reactions     Nkda [No Known Drug Allergies]      Physical Exam   Vital Signs: Temp: 97.6  F (36.4  C) Temp src: Oral BP: (!) 130/92 Pulse: 77   Resp: 11 SpO2: 96 % O2 Device: Nasal cannula Oxygen Delivery: 4 LPM  Weight: 175 lbs 0 oz    Constitutional: sitting up on edge of ED bed leaning over tray, awake, alert, cooperative, no apparent distress, and appears stated age  ENT: oropharynx is clear and moist  Respiratory: Leaning forward in tripod  position though work of breathing only mildly increased with this, moderate work after coughing episode, speaking in full sentences. Poor air exchange. Diffuse tight wheeze noted, scattered rhonchi. No crackles.  Cardiovascular: regular rate and rhythm. No lower extremity edema.  GI:  soft, non-distended, non-tender  Genitounirinary: deferred  Skin: warm and dry  Musculoskeletal: moving all 4 extremities.  Neurologic: Awake, alert, oriented to name, place and time.   Neuropsychiatric: calm, pleasant, cooperative. Appropriate thought process/content. Short term memory is grossly intact.    Data   Data reviewed today: I reviewed all medications, new labs and imaging results over the last 24 hours. I personally reviewed no images or EKG's today.    Recent Labs   Lab 05/11/22  1559   WBC 4.2   HGB 13.8      *      POTASSIUM 3.5   CHLORIDE 114*   CO2 24   BUN 10   CR 0.73   ANIONGAP 5   ERICA 6.7*   GLC 93     Recent Results (from the past 24 hour(s))   XR Chest Port 1 View    Narrative    CHEST PORTABLE ONE VIEW   5/11/2022 4:50 PM     HISTORY: Wheezing, SOB.    COMPARISON: None available.      Impression    IMPRESSION: AP views of the chest were obtained. Postsurgical changes  of cardiac surgery with median sternotomy wires. Cardiomediastinal  silhouette is within normal limits. A few small radiodensities project  over the lung bases, likely represent calcified granulomas. No  significant pleural effusion or pneumothorax. Small nodular opacity  projects over the lateral aspect of the right midlung, likely  represent nipple shadow, which can be confirmed by repeating the x-ray  with nipple marker.    TL TIWARI MD         SYSTEM ID:  [id number]   CT Chest Pulmonary Embolism w Contrast    Narrative    EXAM: CT CHEST PULMONARY EMBOLISM W CONTRAST  LOCATION: [facility]  DATE/TIME: 5/11/2022 7:31 PM    INDICATION: PE suspected, low/intermediate prob, positive  D-dimer.  COMPARISON: None.  TECHNIQUE: CT chest pulmonary angiogram during arterial phase injection of IV contrast. Multiplanar reformats and MIP reconstructions were performed. Dose reduction techniques were used.   CONTRAST: 76 ml Isovue 370.    FINDINGS:  ANGIOGRAM CHEST: Pulmonary arteries are normal caliber and negative for pulmonary emboli. 4.9 x 4.4 cm ascending thoracic aneurysm. No CT evidence of right heart strain.    LUNGS AND PLEURA: Moderate emphysema. Simple right lower lobe calcification. No pneumothorax or pleural effusion.    MEDIASTINUM/AXILLAE: Normal size heart. No pericardial effusion. No hilar or mediastinal lymphadenopathy. Calcified hilar lymph nodes. Prosthetic aortic valve.    CORONARY ARTERY CALCIFICATION: Severe.    UPPER ABDOMEN: Scattered calcified granulomata of the liver and spleen.    MUSCULOSKELETAL: Diffuse osteopenia. Mild multilevel discogenic degenerative change.      Impression    IMPRESSION:  1.  No pulmonary embolus.  2.  4.9 x 4.4 cm ascending thoracic aortic aneurysm with aortic valve replacement.  3.  Coronary artery disease and atherosclerotic vascular disease.  4.  Emphysema.  5.  Ancient granulomatous disease/infection.

## 2022-05-12 NOTE — PROGRESS NOTES
MD removed oxygen.  States have him walk around without and recheck.  When this writer went into room o2 sat was 90%. Will continue to monitor with activity-talked with daughter Nithya while in room/consent given by pt

## 2022-05-12 NOTE — DISCHARGE SUMMARY
[facility]  Discharge Summary  Hospital Medicine       Date of Admission:  5/11/2022  Date of Discharge:  5/12/2022  3:21 PM  Discharging Provider: Eladio Ribeiro MD      Identification and Chief Compaint: Sunny Inman is a 76 year old male who presented on 5/11/2022 with complaint of cough and dyspnea x 1 day.    Discharge Diagnoses   Acute chronic obstructive pulmonary disease exacerbation  Acute hypoxic respiratory failure, likely chronic hypercapnia   Chronic Systolic Heart Failure  Coronary Artery Disease  Hyperlipidemia  Aortic Stenosis s/p bioprosthetic valve replacement  Thrombocytopenia     Follow-ups Needed After Discharge   Follow-up Appointments     Follow-up and recommended labs and tests       Follow up with primary care provider, [facility], within 7   days for hospital follow- up.  No follow up labs or test are needed.           Hospital Course   Sunny Inman is a 76 year old male admitted on 5/11/2022. He has history of COPD, CAD s/p bypass, CHF, aortic stenosis s/p bioprosthetic valve replacement, thrombocytopenia and at risk alcohol use. He presents with cough and dyspnea x 1 day.     Acute COPD exacerbation with acute hypoxic respiratory failure, likely chronic hypercapnia   1 day history of shortness of breath, productive cough, and new oxygen demands. Oxygenation of 88% on arrival with increased work of breathing, initially on 4 LPM though weaned down to 1-2 LPM on admission. VBG shows compensated respiratory acidosis with pH 7.32, CO2 57, bicarb 29. History of COPD, does not recall exacerbations. No PFTs on file. Appears to be COPD exacerbation with mild hypoxia. Received 60 mg prednisone, duonebs and antibiotics in the ED.  - nebulizers: scheduled duonebs Q4 while awake, albuterol prn  - steroids: prednisone 40 mg daily  - Antibiotics: azithromycin   - flutter valve ordered to help clear mucus    Patient has improved significantly on day of  discharge.    - complete steroid burst and azithromycin course on discharge     Chronic Systolic Heart Failure,   Appears compensated. Echo not available though problem list notes EF 35%, unclear date or current EF.   - Continue home carvedilol, lisinopril   - Follow daily weights  - Judicious use of IVF     Coronary Artery Disease  Hyperlipidemia    Previously diagnosed. History of CABG about 5-6 years ago. Of note, patient was taking both doses of his carvedilol in the morning because he was forgetting to take the evening dose (reportedly he had been told to do this). Encouraged patient to again discuss with his PCP as this should be taken twice daily. Ordered for BID dosing while hospitalized and discussed importance of this schedule.  - continue home ASA, beta blocker, ACEi  - not on statin, defer to outpatient     Aortic Stenosis s/p bioprosthetic valve replacement    History of bioprosthetic valve replacement.   - continue outpatient surveillance      Thrombocytopenia    Platelets 117 on presentation. Thrombocytopenia noted in problem list though unclear baseline, no prior records.  - AM CBC      At risk Alcohol use    Reports drinking 6 beers/day on average. Denies history of withdrawal in the past.    No evidence of withdrawal this admission.        Consultations This Hospital Stay   None    Code Status   Full Code    The discharge plan was discussed with the patient, and he expressed understanding.     Time Spent on this Encounter   Total time on this discharge was 30 minutes.       Eladio Ribeiro MD  [facility]  ______________________________________________________________________    Physical Exam   Vital Signs: Temp: 97.6  F (36.4  C) Temp src: Oral BP: 113/61 Pulse: 67   Resp: 18 SpO2: 94 % O2 Device: Nasal cannula Oxygen Delivery: 3 LPM  Weight: 169 lbs 12.07 oz  Constitutional: alert and oriented, NAD, nontoxic  CV: Regular  Respiratory: diminished breath sounds,  decent air movement, mild exp wheezes bilaterally  GI: Soft, non-tender   Skin: Warm and dry       Primary Care Physician   [facility]  [id number] [id number]     Discharge Disposition   Discharged to home  Condition at discharge: Stable    Significant Results and Procedures   Results for orders placed or performed during the hospital encounter of 05/11/22   XR Chest Port 1 View    Narrative    CHEST PORTABLE ONE VIEW   5/11/2022 4:50 PM     HISTORY: Wheezing, SOB.    COMPARISON: None available.      Impression    IMPRESSION: AP views of the chest were obtained. Postsurgical changes  of cardiac surgery with median sternotomy wires. Cardiomediastinal  silhouette is within normal limits. A few small radiodensities project  over the lung bases, likely represent calcified granulomas. No  significant pleural effusion or pneumothorax. Small nodular opacity  projects over the lateral aspect of the right midlung, likely  represent nipple shadow, which can be confirmed by repeating the x-ray  with nipple marker.    TL TIWARI MD         SYSTEM ID:  [id number]   CT Chest Pulmonary Embolism w Contrast    Narrative    EXAM: CT CHEST PULMONARY EMBOLISM W CONTRAST  LOCATION: [facility]  DATE/TIME: 5/11/2022 7:31 PM    INDICATION: PE suspected, low/intermediate prob, positive D-dimer.  COMPARISON: None.  TECHNIQUE: CT chest pulmonary angiogram during arterial phase injection of IV contrast. Multiplanar reformats and MIP reconstructions were performed. Dose reduction techniques were used.   CONTRAST: 76 ml Isovue 370.    FINDINGS:  ANGIOGRAM CHEST: Pulmonary arteries are normal caliber and negative for pulmonary emboli. 4.9 x 4.4 cm ascending thoracic aneurysm. No CT evidence of right heart strain.    LUNGS AND PLEURA: Moderate emphysema. Simple right lower lobe calcification. No pneumothorax or pleural effusion.    MEDIASTINUM/AXILLAE: Normal size heart. No  pericardial effusion. No hilar or mediastinal lymphadenopathy. Calcified hilar lymph nodes. Prosthetic aortic valve.    CORONARY ARTERY CALCIFICATION: Severe.    UPPER ABDOMEN: Scattered calcified granulomata of the liver and spleen.    MUSCULOSKELETAL: Diffuse osteopenia. Mild multilevel discogenic degenerative change.      Impression    IMPRESSION:  1.  No pulmonary embolus.  2.  4.9 x 4.4 cm ascending thoracic aortic aneurysm with aortic valve replacement.  3.  Coronary artery disease and atherosclerotic vascular disease.  4.  Emphysema.  5.  Ancient granulomatous disease/infection.     Procedures    None    Discharge Orders      Reason for your hospital stay    COPD exacerbation     Follow-up and recommended labs and tests     Follow up with primary care provider, [facility], within 7 days for hospital follow- up.  No follow up labs or test are needed.     Activity    Your activity upon discharge: activity as tolerated     Diet    Follow this diet upon discharge:       Regular Diet Adult     Discharge Medications   Current Discharge Medication List      START taking these medications    Details   azithromycin (ZITHROMAX) 250 MG tablet Take 1 tablet (250 mg) by mouth daily for 4 days In the evening  Qty: 4 tablet, Refills: 0    Associated Diagnoses: COPD exacerbation (H)      predniSONE (DELTASONE) 20 MG tablet Take 2 tablets (40 mg) by mouth daily for 3 days In the morning  Qty: 6 tablet, Refills: 0    Associated Diagnoses: COPD exacerbation (H)         CONTINUE these medications which have NOT CHANGED    Details   albuterol (PROAIR HFA/PROVENTIL HFA/VENTOLIN HFA) 108 (90 Base) MCG/ACT inhaler       ASPIRIN PO Take 81 mg by mouth      carvedilol (COREG) 25 MG tablet Take 12.5 mg by mouth 2 times daily (with meals)      lisinopril (ZESTRIL) 10 MG tablet Take 0.5 tablets by mouth daily      tiotropium (SPIRIVA RESPIMAT) 2.5 MCG/ACT inhaler Inhale 2 puffs into the lungs daily           Allergies    Allergies   Allergen Reactions     Nkda [No Known Drug Allergies]

## 2022-05-12 NOTE — ED NOTES
Called [facility] and informed them of ER visit and admission. Referral ID: [id number]. Spoke to Nai

## 2022-05-12 NOTE — PROGRESS NOTES
Patient reports breathing much better compared to yesterday. Used 3L oxygen via NC overnight. His son brought in his CPAP machine, but he did not use as he reported he is waiting for a new device to be sent to him and not using this one.     /67 (BP Location: Left arm)   Pulse 72   Temp 97.5  F (36.4  C) (Oral)   Resp 18   Ht 1.829 m (6')   Wt 77 kg (169 lb 12.1 oz)   SpO2 99%   BMI 23.02 kg/m

## 2022-05-12 NOTE — PROGRESS NOTES
[facility] DISCHARGE NOTE    Patient discharged to home at 3:21 PM via wheel chair. Accompanied by staff. Discharge instructions reviewed with patient, opportunity offered to ask questions. Prescriptions sent to patients preferred pharmacy. All belongings sent with patient.    Silvana Zuluaga RN

## 2022-05-13 ENCOUNTER — PATIENT OUTREACH (OUTPATIENT)
Dept: CARE COORDINATION | Facility: CLINIC | Age: 77
End: 2022-05-13

## 2022-05-13 DIAGNOSIS — Z71.89 OTHER SPECIFIED COUNSELING: ICD-10-CM

## 2022-05-13 NOTE — PROGRESS NOTES
"Clinic Care Coordination Contact  [facility]: Post-Discharge Note  SITUATION                                                      Admission:    Admission Date: 05/11/22   Reason for Admission: COPD exacerbation  Discharge:   Discharge Date: 05/12/22  Discharge Diagnosis: COPD exacerbation    BACKGROUND                                                      Per hospital discharge summary and inpatient provider notes:    Sunny Inman is a 76 year old male who presents due to cough and dyspnea.     Yesterday he developed a productive cough and mild shortness of breath. His cough is productive of thick sputum. Overnight his breathing worsened and so he presented to the emergency department. He feels better when he leans forward across the dinner tray (tripod position). He was unsure if he had a history of COPD though his daughter later came in and confirmed this. He does use inhalers at home. He does have a history of CHF though denies recent weight gain or lower extremity edema. He does not recall a prior COPD exacerbation though does report heavy smoking for at least 40 years. He feels somewhat better after nebulizer and steroids in the ED.      He reports some chest discomfort which is sharp and lasts less than a second. This is not new for him. At times it is associated with coughing.      He denies fevers, chills, myalgias, lightheadedness, dizziness, change in baseline congestion, palpitations, abdominal pain, nausea, emesis, diarrhea or urinary changes.    ASSESSMENT      Enrollment  Primary Care Care Coordination Status: Not a Candidate    Discharge Assessment  How are you doing now that you are home?: \"Still coughing a lot\"  How are your symptoms? (Red Flag symptoms escalate to triage hotline per guidelines): Unchanged  Do you feel your condition is stable enough to be safe at home until your provider visit?: Yes  Does the patient have their discharge instructions? : Yes  Does the patient have questions " regarding their discharge instructions? : No  Were you started on any new medications or were there changes to any of your previous medications? : Yes  Does the patient have all of their medications?: Yes  Do you have questions regarding any of your medications? : No  Do you have all of your needed medical supplies or equipment (DME)?  (i.e. oxygen tank, CPAP, cane, etc.): Yes  Discharge follow-up appointment scheduled within 14 calendar days? : No  Is patient agreeable to assistance with scheduling? : No    Post-op (LYLE CTA Only)  If the patient had a surgery or procedure, do they have any questions for a nurse?: No    PLAN                                                      Outpatient Plan:    Follow up with primary care provider, [facility], within 7 days for hospital follow- up. No follow up labs  or test are needed.    No future appointments.      For any urgent concerns, please contact our 24 hour nurse triage line: [phone] ([phone])         LYLE Wiley  [phone]  Connected Care Resource [facility]

## 2022-08-19 ENCOUNTER — HOSPITAL ENCOUNTER (OUTPATIENT)
Dept: CARDIOLOGY | Facility: CLINIC | Age: 77
Discharge: HOME | End: 2022-08-19
Attending: FAMILY MEDICINE | Admitting: FAMILY MEDICINE
Payer: COMMERCIAL

## 2022-08-19 DIAGNOSIS — R06.00 DYSPNEA, UNSPECIFIED: ICD-10-CM

## 2022-08-19 LAB — LVEF ECHO: NORMAL

## 2022-08-19 PROCEDURE — 255N000002 HC RX 255 OP 636: Performed by: INTERNAL MEDICINE

## 2022-08-19 PROCEDURE — 93306 TTE W/DOPPLER COMPLETE: CPT | Mod: 26 | Performed by: INTERNAL MEDICINE

## 2022-08-19 RX ADMIN — HUMAN ALBUMIN MICROSPHERES AND PERFLUTREN 2 ML: 10; .22 INJECTION, SOLUTION INTRAVENOUS at 11:01

## 2023-08-04 ENCOUNTER — APPOINTMENT (OUTPATIENT)
Dept: GENERAL RADIOLOGY | Facility: CLINIC | Age: 78
End: 2023-08-04
Attending: FAMILY MEDICINE
Payer: COMMERCIAL

## 2023-08-04 ENCOUNTER — APPOINTMENT (OUTPATIENT)
Dept: CT IMAGING | Facility: CLINIC | Age: 78
End: 2023-08-04
Attending: FAMILY MEDICINE
Payer: COMMERCIAL

## 2023-08-04 ENCOUNTER — HOSPITAL ENCOUNTER (EMERGENCY)
Facility: CLINIC | Age: 78
Discharge: ANOTHER HOSPITAL | End: 2023-08-04
Attending: FAMILY MEDICINE | Admitting: FAMILY MEDICINE
Payer: COMMERCIAL

## 2023-08-04 VITALS
DIASTOLIC BLOOD PRESSURE: 71 MMHG | RESPIRATION RATE: 18 BRPM | HEART RATE: 84 BPM | OXYGEN SATURATION: 93 % | TEMPERATURE: 98 F | SYSTOLIC BLOOD PRESSURE: 92 MMHG
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
## 2023-08-04 DIAGNOSIS — S06.5XAA SUBDURAL HEMATOMA (H): ICD-10-CM

## 2023-08-04 DIAGNOSIS — K04.7 DENTAL ABSCESS: ICD-10-CM

## 2023-08-04 DIAGNOSIS — S02.2XXA CLOSED FRACTURE OF NASAL BONE, INITIAL ENCOUNTER: ICD-10-CM

## 2023-08-04 DIAGNOSIS — S42.121A CLOSED DISPLACED FRACTURE OF ACROMIAL PROCESS OF RIGHT SCAPULA, INITIAL ENCOUNTER: ICD-10-CM

## 2023-08-04 DIAGNOSIS — F10.129 ALCOHOL ABUSE WITH INTOXICATION (H): ICD-10-CM

## 2023-08-04 DIAGNOSIS — S06.33AA: ICD-10-CM

## 2023-08-04 LAB
ALBUMIN SERPL BCG-MCNC: 4 G/DL (ref 3.5–5.2)
ALP SERPL-CCNC: 65 U/L (ref 40–129)
ALT SERPL W P-5'-P-CCNC: 17 U/L (ref 0–70)
ANION GAP SERPL CALCULATED.3IONS-SCNC: 14 MMOL/L (ref 7–15)
AST SERPL W P-5'-P-CCNC: 26 U/L (ref 0–45)
BASOPHILS # BLD AUTO: 0 10E3/UL (ref 0–0.2)
BASOPHILS NFR BLD AUTO: 1 %
BILIRUB SERPL-MCNC: 0.4 MG/DL
BUN SERPL-MCNC: 10.7 MG/DL (ref 8–23)
CALCIUM SERPL-MCNC: 8.4 MG/DL (ref 8.8–10.2)
CHLORIDE SERPL-SCNC: 94 MMOL/L (ref 98–107)
CREAT SERPL-MCNC: 0.71 MG/DL (ref 0.67–1.17)
DEPRECATED HCO3 PLAS-SCNC: 21 MMOL/L (ref 22–29)
EOSINOPHIL # BLD AUTO: 0.1 10E3/UL (ref 0–0.7)
EOSINOPHIL NFR BLD AUTO: 2 %
ERYTHROCYTE [DISTWIDTH] IN BLOOD BY AUTOMATED COUNT: 12.3 % (ref 10–15)
ETHANOL SERPL-MCNC: 0.27 G/DL
GFR SERPL CREATININE-BSD FRML MDRD: >90 ML/MIN/1.73M2
GLUCOSE SERPL-MCNC: 97 MG/DL (ref 70–99)
HCT VFR BLD AUTO: 39.5 % (ref 40–53)
HGB BLD-MCNC: 13.6 G/DL (ref 13.3–17.7)
HOLD SPECIMEN: NORMAL
IMM GRANULOCYTES # BLD: 0 10E3/UL
IMM GRANULOCYTES NFR BLD: 0 %
INR PPP: 1.02 (ref 0.85–1.15)
LYMPHOCYTES # BLD AUTO: 2.3 10E3/UL (ref 0.8–5.3)
LYMPHOCYTES NFR BLD AUTO: 42 %
MCH RBC QN AUTO: 34.1 PG (ref 26.5–33)
MCHC RBC AUTO-ENTMCNC: 34.4 G/DL (ref 31.5–36.5)
MCV RBC AUTO: 99 FL (ref 78–100)
MONOCYTES # BLD AUTO: 0.7 10E3/UL (ref 0–1.3)
MONOCYTES NFR BLD AUTO: 14 %
NEUTROPHILS # BLD AUTO: 2.2 10E3/UL (ref 1.6–8.3)
NEUTROPHILS NFR BLD AUTO: 41 %
NRBC # BLD AUTO: 0 10E3/UL
NRBC BLD AUTO-RTO: 0 /100
PLATELET # BLD AUTO: 132 10E3/UL (ref 150–450)
POTASSIUM SERPL-SCNC: 4 MMOL/L (ref 3.4–5.3)
PROT SERPL-MCNC: 6.5 G/DL (ref 6.4–8.3)
RBC # BLD AUTO: 3.99 10E6/UL (ref 4.4–5.9)
SODIUM SERPL-SCNC: 129 MMOL/L (ref 136–145)
WBC # BLD AUTO: 5.3 10E3/UL (ref 4–11)

## 2023-08-04 PROCEDURE — 70450 CT HEAD/BRAIN W/O DYE: CPT

## 2023-08-04 PROCEDURE — 72125 CT NECK SPINE W/O DYE: CPT

## 2023-08-04 PROCEDURE — 82077 ASSAY SPEC XCP UR&BREATH IA: CPT | Performed by: FAMILY MEDICINE

## 2023-08-04 PROCEDURE — 250N000011 HC RX IP 250 OP 636: Performed by: FAMILY MEDICINE

## 2023-08-04 PROCEDURE — 99285 EMERGENCY DEPT VISIT HI MDM: CPT | Performed by: FAMILY MEDICINE

## 2023-08-04 PROCEDURE — 36415 COLL VENOUS BLD VENIPUNCTURE: CPT | Performed by: FAMILY MEDICINE

## 2023-08-04 PROCEDURE — 84155 ASSAY OF PROTEIN SERUM: CPT | Performed by: FAMILY MEDICINE

## 2023-08-04 PROCEDURE — 96374 THER/PROPH/DIAG INJ IV PUSH: CPT | Mod: 59

## 2023-08-04 PROCEDURE — 85025 COMPLETE CBC W/AUTO DIFF WBC: CPT | Performed by: FAMILY MEDICINE

## 2023-08-04 PROCEDURE — 250N000009 HC RX 250: Performed by: FAMILY MEDICINE

## 2023-08-04 PROCEDURE — 85610 PROTHROMBIN TIME: CPT | Performed by: FAMILY MEDICINE

## 2023-08-04 PROCEDURE — 73030 X-RAY EXAM OF SHOULDER: CPT | Mod: 50

## 2023-08-04 PROCEDURE — 84450 TRANSFERASE (AST) (SGOT): CPT | Performed by: FAMILY MEDICINE

## 2023-08-04 PROCEDURE — 74177 CT ABD & PELVIS W/CONTRAST: CPT

## 2023-08-04 PROCEDURE — 96374 THER/PROPH/DIAG INJ IV PUSH: CPT | Performed by: FAMILY MEDICINE

## 2023-08-04 PROCEDURE — 99291 CRITICAL CARE FIRST HOUR: CPT | Mod: 25 | Performed by: FAMILY MEDICINE

## 2023-08-04 PROCEDURE — 70486 CT MAXILLOFACIAL W/O DYE: CPT

## 2023-08-04 RX ORDER — LORAZEPAM 2 MG/ML
1 INJECTION INTRAMUSCULAR ONCE
Status: COMPLETED | OUTPATIENT
Start: 2023-08-04 | End: 2023-08-04

## 2023-08-04 RX ORDER — IOPAMIDOL 755 MG/ML
83 INJECTION, SOLUTION INTRAVASCULAR ONCE
Status: COMPLETED | OUTPATIENT
Start: 2023-08-04 | End: 2023-08-04

## 2023-08-04 RX ADMIN — IOPAMIDOL 83 ML: 755 INJECTION, SOLUTION INTRAVENOUS at 21:49

## 2023-08-04 RX ADMIN — SODIUM CHLORIDE 61 ML: 9 INJECTION, SOLUTION INTRAVENOUS at 21:49

## 2023-08-04 RX ADMIN — LORAZEPAM 1 MG: 2 INJECTION INTRAMUSCULAR; INTRAVENOUS at 22:36

## 2023-08-04 ASSESSMENT — ACTIVITIES OF DAILY LIVING (ADL)
ADLS_ACUITY_SCORE: 35
ADLS_ACUITY_SCORE: 35

## 2023-08-05 ENCOUNTER — HOSPITAL ENCOUNTER (INPATIENT)
Facility: CLINIC | Age: 78
LOS: 1 days | Discharge: HOME | DRG: 083 | End: 2023-08-07
Attending: EMERGENCY MEDICINE | Admitting: SURGERY
Payer: COMMERCIAL

## 2023-08-05 ENCOUNTER — APPOINTMENT (OUTPATIENT)
Dept: CT IMAGING | Facility: CLINIC | Age: 78
DRG: 083 | End: 2023-08-05
Attending: EMERGENCY MEDICINE
Payer: COMMERCIAL

## 2023-08-05 ENCOUNTER — APPOINTMENT (OUTPATIENT)
Dept: CT IMAGING | Facility: CLINIC | Age: 78
DRG: 083 | End: 2023-08-05
Attending: STUDENT IN AN ORGANIZED HEALTH CARE EDUCATION/TRAINING PROGRAM
Payer: COMMERCIAL

## 2023-08-05 DIAGNOSIS — S06.5XAA SUBDURAL HEMATOMA (H): ICD-10-CM

## 2023-08-05 DIAGNOSIS — F10.929 ALCOHOLIC INTOXICATION WITH COMPLICATION (H): ICD-10-CM
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
## 2023-08-05 DIAGNOSIS — S06.330A: ICD-10-CM

## 2023-08-05 LAB
BASOPHILS # BLD AUTO: 0 10E3/UL (ref 0–0.2)
BASOPHILS NFR BLD AUTO: 0 %
EOSINOPHIL # BLD AUTO: 0.1 10E3/UL (ref 0–0.7)
EOSINOPHIL NFR BLD AUTO: 1 %
ERYTHROCYTE [DISTWIDTH] IN BLOOD BY AUTOMATED COUNT: 12.4 % (ref 10–15)
FERRITIN SERPL-MCNC: 383 NG/ML (ref 31–409)
HCT VFR BLD AUTO: 37 % (ref 40–53)
HGB BLD-MCNC: 12.6 G/DL (ref 13.3–17.7)
HOLD SPECIMEN: NORMAL
IMM GRANULOCYTES # BLD: 0 10E3/UL
IMM GRANULOCYTES NFR BLD: 0 %
IRON BINDING CAPACITY (ROCHE): 204 UG/DL (ref 240–430)
IRON SATN MFR SERPL: 50 % (ref 15–46)
IRON SERPL-MCNC: 101 UG/DL (ref 61–157)
LYMPHOCYTES # BLD AUTO: 1.3 10E3/UL (ref 0.8–5.3)
LYMPHOCYTES NFR BLD AUTO: 27 %
MAGNESIUM SERPL-MCNC: 1.7 MG/DL (ref 1.7–2.3)
MCH RBC QN AUTO: 33.3 PG (ref 26.5–33)
MCHC RBC AUTO-ENTMCNC: 34.1 G/DL (ref 31.5–36.5)
MCV RBC AUTO: 98 FL (ref 78–100)
MONOCYTES # BLD AUTO: 0.5 10E3/UL (ref 0–1.3)
MONOCYTES NFR BLD AUTO: 10 %
NEUTROPHILS # BLD AUTO: 3 10E3/UL (ref 1.6–8.3)
NEUTROPHILS NFR BLD AUTO: 62 %
NRBC # BLD AUTO: 0 10E3/UL
NRBC BLD AUTO-RTO: 0 /100
PHOSPHATE SERPL-MCNC: 2.9 MG/DL (ref 2.5–4.5)
PLATELET # BLD AUTO: 127 10E3/UL (ref 150–450)
RBC # BLD AUTO: 3.78 10E6/UL (ref 4.4–5.9)
SODIUM SERPL-SCNC: 129 MMOL/L (ref 136–145)
SODIUM SERPL-SCNC: 130 MMOL/L (ref 136–145)
WBC # BLD AUTO: 4.9 10E3/UL (ref 4–11)

## 2023-08-05 PROCEDURE — 84295 ASSAY OF SERUM SODIUM: CPT | Performed by: PHYSICIAN ASSISTANT

## 2023-08-05 PROCEDURE — 250N000011 HC RX IP 250 OP 636: Mod: JZ | Performed by: SURGERY

## 2023-08-05 PROCEDURE — 99285 EMERGENCY DEPT VISIT HI MDM: CPT | Mod: 25 | Performed by: EMERGENCY MEDICINE

## 2023-08-05 PROCEDURE — 250N000013 HC RX MED GY IP 250 OP 250 PS 637: Performed by: SURGERY

## 2023-08-05 PROCEDURE — 83550 IRON BINDING TEST: CPT | Performed by: PHYSICIAN ASSISTANT

## 2023-08-05 PROCEDURE — 250N000013 HC RX MED GY IP 250 OP 250 PS 637: Performed by: PHYSICIAN ASSISTANT

## 2023-08-05 PROCEDURE — 96367 TX/PROPH/DG ADDL SEQ IV INF: CPT

## 2023-08-05 PROCEDURE — 85025 COMPLETE CBC W/AUTO DIFF WBC: CPT | Performed by: SURGERY

## 2023-08-05 PROCEDURE — 36415 COLL VENOUS BLD VENIPUNCTURE: CPT | Performed by: SURGERY

## 2023-08-05 PROCEDURE — 83735 ASSAY OF MAGNESIUM: CPT | Performed by: PHYSICIAN ASSISTANT

## 2023-08-05 PROCEDURE — 70450 CT HEAD/BRAIN W/O DYE: CPT | Mod: 77

## 2023-08-05 PROCEDURE — 70450 CT HEAD/BRAIN W/O DYE: CPT | Mod: 26 | Performed by: RADIOLOGY

## 2023-08-05 PROCEDURE — 36415 COLL VENOUS BLD VENIPUNCTURE: CPT | Performed by: PHYSICIAN ASSISTANT

## 2023-08-05 PROCEDURE — 258N000003 HC RX IP 258 OP 636: Performed by: SURGERY

## 2023-08-05 PROCEDURE — 96365 THER/PROPH/DIAG IV INF INIT: CPT

## 2023-08-05 PROCEDURE — 99221 1ST HOSP IP/OBS SF/LOW 40: CPT | Mod: GC | Performed by: NEUROLOGICAL SURGERY

## 2023-08-05 PROCEDURE — 250N000009 HC RX 250: Performed by: SURGERY

## 2023-08-05 PROCEDURE — 99232 SBSQ HOSP IP/OBS MODERATE 35: CPT | Performed by: PHYSICIAN ASSISTANT

## 2023-08-05 PROCEDURE — 96361 HYDRATE IV INFUSION ADD-ON: CPT

## 2023-08-05 PROCEDURE — 99285 EMERGENCY DEPT VISIT HI MDM: CPT | Performed by: EMERGENCY MEDICINE

## 2023-08-05 PROCEDURE — 250N000011 HC RX IP 250 OP 636: Performed by: PHYSICIAN ASSISTANT

## 2023-08-05 PROCEDURE — G0378 HOSPITAL OBSERVATION PER HR: HCPCS

## 2023-08-05 PROCEDURE — 96376 TX/PRO/DX INJ SAME DRUG ADON: CPT

## 2023-08-05 PROCEDURE — 84100 ASSAY OF PHOSPHORUS: CPT | Performed by: PHYSICIAN ASSISTANT

## 2023-08-05 PROCEDURE — 682N000003 HC TRAUMA EVALUATION W/O CC LEVEL II: Performed by: EMERGENCY MEDICINE

## 2023-08-05 PROCEDURE — 96366 THER/PROPH/DIAG IV INF ADDON: CPT

## 2023-08-05 PROCEDURE — 258N000003 HC RX IP 258 OP 636: Performed by: PHYSICIAN ASSISTANT

## 2023-08-05 PROCEDURE — 70450 CT HEAD/BRAIN W/O DYE: CPT

## 2023-08-05 PROCEDURE — 82728 ASSAY OF FERRITIN: CPT | Performed by: PHYSICIAN ASSISTANT

## 2023-08-05 RX ORDER — GABAPENTIN 600 MG/1
1200 TABLET ORAL ONCE
Status: COMPLETED | OUTPATIENT
Start: 2023-08-05 | End: 2023-08-05

## 2023-08-05 RX ORDER — LISINOPRIL 2.5 MG/1
5 TABLET ORAL DAILY
Status: DISCONTINUED | OUTPATIENT
Start: 2023-08-05 | End: 2023-08-07 | Stop reason: HOSPADM

## 2023-08-05 RX ORDER — METHOCARBAMOL 500 MG/1
500 TABLET, FILM COATED ORAL EVERY 6 HOURS PRN
Status: DISCONTINUED | OUTPATIENT
Start: 2023-08-05 | End: 2023-08-07 | Stop reason: HOSPADM

## 2023-08-05 RX ORDER — CLONIDINE HYDROCHLORIDE 0.1 MG/1
0.1 TABLET ORAL EVERY 8 HOURS
Status: DISCONTINUED | OUTPATIENT
Start: 2023-08-05 | End: 2023-08-07 | Stop reason: HOSPADM

## 2023-08-05 RX ORDER — SODIUM CHLORIDE 9 MG/ML
INJECTION, SOLUTION INTRAVENOUS CONTINUOUS
Status: DISCONTINUED | OUTPATIENT
Start: 2023-08-05 | End: 2023-08-06

## 2023-08-05 RX ORDER — FOLIC ACID 1 MG/1
1 TABLET ORAL DAILY
Status: DISCONTINUED | OUTPATIENT
Start: 2023-08-05 | End: 2023-08-07 | Stop reason: HOSPADM

## 2023-08-05 RX ORDER — GABAPENTIN 100 MG/1
100 CAPSULE ORAL DAILY
Status: DISCONTINUED | OUTPATIENT
Start: 2023-08-05 | End: 2023-08-06

## 2023-08-05 RX ORDER — GABAPENTIN 100 MG/1
100 CAPSULE ORAL EVERY 8 HOURS
Status: DISCONTINUED | OUTPATIENT
Start: 2023-08-12 | End: 2023-08-07 | Stop reason: HOSPADM

## 2023-08-05 RX ORDER — ACETAMINOPHEN 325 MG/1
975 TABLET ORAL EVERY 8 HOURS
Status: DISCONTINUED | OUTPATIENT
Start: 2023-08-05 | End: 2023-08-07 | Stop reason: HOSPADM

## 2023-08-05 RX ORDER — GABAPENTIN 300 MG/1
900 CAPSULE ORAL EVERY 8 HOURS
Status: DISCONTINUED | OUTPATIENT
Start: 2023-08-05 | End: 2023-08-07 | Stop reason: HOSPADM

## 2023-08-05 RX ORDER — GABAPENTIN 300 MG/1
300 CAPSULE ORAL EVERY 8 HOURS
Status: DISCONTINUED | OUTPATIENT
Start: 2023-08-10 | End: 2023-08-07 | Stop reason: HOSPADM

## 2023-08-05 RX ORDER — HALOPERIDOL 5 MG/ML
2.5-5 INJECTION INTRAMUSCULAR EVERY 6 HOURS PRN
Status: DISCONTINUED | OUTPATIENT
Start: 2023-08-05 | End: 2023-08-07 | Stop reason: HOSPADM

## 2023-08-05 RX ORDER — ONDANSETRON 4 MG/1
4 TABLET, ORALLY DISINTEGRATING ORAL EVERY 6 HOURS PRN
Status: DISCONTINUED | OUTPATIENT
Start: 2023-08-05 | End: 2023-08-07 | Stop reason: HOSPADM

## 2023-08-05 RX ORDER — GABAPENTIN 300 MG/1
600 CAPSULE ORAL EVERY 8 HOURS
Status: DISCONTINUED | OUTPATIENT
Start: 2023-08-08 | End: 2023-08-07 | Stop reason: HOSPADM

## 2023-08-05 RX ORDER — ONDANSETRON 2 MG/ML
4 INJECTION INTRAMUSCULAR; INTRAVENOUS EVERY 6 HOURS PRN
Status: DISCONTINUED | OUTPATIENT
Start: 2023-08-05 | End: 2023-08-07 | Stop reason: HOSPADM

## 2023-08-05 RX ORDER — CARVEDILOL 12.5 MG/1
12.5 TABLET ORAL 2 TIMES DAILY WITH MEALS
Status: DISCONTINUED | OUTPATIENT
Start: 2023-08-05 | End: 2023-08-07 | Stop reason: HOSPADM

## 2023-08-05 RX ORDER — OLANZAPINE 5 MG/1
5-10 TABLET, ORALLY DISINTEGRATING ORAL EVERY 6 HOURS PRN
Status: DISCONTINUED | OUTPATIENT
Start: 2023-08-05 | End: 2023-08-07 | Stop reason: HOSPADM

## 2023-08-05 RX ORDER — ALBUTEROL SULFATE 90 UG/1
1-2 AEROSOL, METERED RESPIRATORY (INHALATION) EVERY 6 HOURS PRN
Status: DISCONTINUED | OUTPATIENT
Start: 2023-08-05 | End: 2023-08-07 | Stop reason: HOSPADM

## 2023-08-05 RX ORDER — MULTIPLE VITAMINS W/ MINERALS TAB 9MG-400MCG
1 TAB ORAL DAILY
Status: DISCONTINUED | OUTPATIENT
Start: 2023-08-05 | End: 2023-08-07 | Stop reason: HOSPADM

## 2023-08-05 RX ORDER — FLUMAZENIL 0.1 MG/ML
0.2 INJECTION, SOLUTION INTRAVENOUS
Status: DISCONTINUED | OUTPATIENT
Start: 2023-08-05 | End: 2023-08-07 | Stop reason: HOSPADM

## 2023-08-05 RX ORDER — LIDOCAINE 40 MG/G
CREAM TOPICAL
Status: DISCONTINUED | OUTPATIENT
Start: 2023-08-05 | End: 2023-08-07 | Stop reason: HOSPADM

## 2023-08-05 RX ADMIN — POTASSIUM CHLORIDE: 2 INJECTION, SOLUTION, CONCENTRATE INTRAVENOUS at 06:26

## 2023-08-05 RX ADMIN — GABAPENTIN 900 MG: 300 CAPSULE ORAL at 22:45

## 2023-08-05 RX ADMIN — THIAMINE HYDROCHLORIDE 500 MG: 100 INJECTION, SOLUTION INTRAMUSCULAR; INTRAVENOUS at 20:15

## 2023-08-05 RX ADMIN — UMECLIDINIUM 1 PUFF: 62.5 AEROSOL, POWDER ORAL at 12:19

## 2023-08-05 RX ADMIN — ACETAMINOPHEN 975 MG: 325 TABLET, FILM COATED ORAL at 06:25

## 2023-08-05 RX ADMIN — Medication 1 TABLET: at 09:02

## 2023-08-05 RX ADMIN — CLONIDINE HYDROCHLORIDE 0.1 MG: 0.1 TABLET ORAL at 09:03

## 2023-08-05 RX ADMIN — FOLIC ACID 1 MG: 1 TABLET ORAL at 09:03

## 2023-08-05 RX ADMIN — ACETAMINOPHEN 975 MG: 325 TABLET, FILM COATED ORAL at 12:17

## 2023-08-05 RX ADMIN — ACETAMINOPHEN 975 MG: 325 TABLET, FILM COATED ORAL at 20:15

## 2023-08-05 RX ADMIN — CLONIDINE HYDROCHLORIDE 0.1 MG: 0.1 TABLET ORAL at 14:34

## 2023-08-05 RX ADMIN — GABAPENTIN 1200 MG: 600 TABLET, FILM COATED ORAL at 09:01

## 2023-08-05 RX ADMIN — LISINOPRIL 5 MG: 2.5 TABLET ORAL at 09:05

## 2023-08-05 RX ADMIN — CARVEDILOL 12.5 MG: 12.5 TABLET, FILM COATED ORAL at 12:17

## 2023-08-05 RX ADMIN — SODIUM CHLORIDE: 9 INJECTION, SOLUTION INTRAVENOUS at 12:18

## 2023-08-05 RX ADMIN — GABAPENTIN 900 MG: 300 CAPSULE ORAL at 14:33

## 2023-08-05 RX ADMIN — THIAMINE HYDROCHLORIDE 500 MG: 100 INJECTION, SOLUTION INTRAMUSCULAR; INTRAVENOUS at 14:30

## 2023-08-05 RX ADMIN — THIAMINE HYDROCHLORIDE 500 MG: 100 INJECTION, SOLUTION INTRAMUSCULAR; INTRAVENOUS at 09:01

## 2023-08-05 ASSESSMENT — ACTIVITIES OF DAILY LIVING (ADL)
ADLS_ACUITY_SCORE: 35
DIFFICULTY_COMMUNICATING: NO
ADLS_ACUITY_SCORE: 24
DRESSING/BATHING_DIFFICULTY: NO
VISION_MANAGEMENT: READING GLASSES
WALKING_OR_CLIMBING_STAIRS_DIFFICULTY: NO
ADLS_ACUITY_SCORE: 35
CHANGE_IN_FUNCTIONAL_STATUS_SINCE_ONSET_OF_CURRENT_ILLNESS/INJURY: NO
NUMBER_OF_TIMES_PATIENT_HAS_FALLEN_WITHIN_LAST_SIX_MONTHS: 2
DOING_ERRANDS_INDEPENDENTLY_DIFFICULTY: NO
ADLS_ACUITY_SCORE: 35
DIFFICULTY_EATING/SWALLOWING: NO
FALL_HISTORY_WITHIN_LAST_SIX_MONTHS: YES
ADLS_ACUITY_SCORE: 35
ADLS_ACUITY_SCORE: 35
HEARING_DIFFICULTY_OR_DEAF: NO
ADLS_ACUITY_SCORE: 35
CONCENTRATING,_REMEMBERING_OR_MAKING_DECISIONS_DIFFICULTY: YES
ADLS_ACUITY_SCORE: 35
ADLS_ACUITY_SCORE: 24
WEAR_GLASSES_OR_BLIND: YES
TOILETING_ISSUES: NO
ADLS_ACUITY_SCORE: 24

## 2023-08-05 NOTE — ED TRIAGE NOTES
Pt from home. Pt states he has been drinking and fell down the stairs. Swelling to right forehead. Bruising around both eyes. Skin tear to left elbow. Denies loc

## 2023-08-05 NOTE — ED NOTES
Pt stating he is going to leave. Discussion with provider Roc, health officer hold advised. Discussed this with patient and wife Analisa. Verbalized understanding. Paper copy given to wife.

## 2023-08-05 NOTE — ED NOTES
Large amount of stool present. Cleaned and brief applied. Pt unable to roll unto left side without having a lot of pain to his left shoulder. Md notified

## 2023-08-05 NOTE — PROGRESS NOTES
Medication Scribe Admission Medication History    Admission medication history is complete. The information provided in this note is only as accurate as the sources available at the time of the update.    Medication reconciliation/reorder completed by provider prior to medication history? No    Information Source(s): Clinic records and CareEverywhere/SureScripts via N/A    Pertinent Information: Spoke with patients wife and she states that their daughter would know patients meds. Called and got no answer. Completed patient med rec per clinic records and care everywhere. Verified [facility] along with dispensary report.     Changes made to PTA medication list:  Added: None  Deleted: None  Changed: None    Medication Affordability:  Not including over the counter (OTC) medications, was there a time in the past 3 months when you did not take your medications as prescribed because of cost?: Unable to Assess    Allergies reviewed with patient and updates made in EHR: no    Medication History Completed By: Claire Figueroa 8/5/2023 9:05 AM    Prior to Admission medications    Medication Sig Last Dose Taking? Auth Provider Long Term End Date   albuterol (PROAIR HFA/PROVENTIL HFA/VENTOLIN HFA) 108 (90 Base) MCG/ACT inhaler Inhale 2 puffs into the lungs 4 times daily as needed for shortness of breath Unknown Yes Reported, Patient Yes    ASPIRIN PO Take 81 mg by mouth Unknown Yes Reported, Patient     carvedilol (COREG) 25 MG tablet Take 12.5 mg by mouth 2 times daily (with meals) Unknown Yes Reported, Patient Yes    lisinopril (ZESTRIL) 10 MG tablet Take 0.5 tablets by mouth daily Unknown Yes Reported, Patient No    tiotropium (SPIRIVA RESPIMAT) 2.5 MCG/ACT inhaler Inhale 2 puffs into the lungs daily Unknown Yes Reported, Patient Yes

## 2023-08-05 NOTE — PROGRESS NOTES
[facility]   Tertiary Survey Progress Note     Date of Service: 08/05/2023    Trauma Mechanism: Fall down stairs   Date of Injury: 8/4/23  Known Injuries:  Multiple small hemorrhagic contusions: inferior frontal lobes   2mm SDH anterior falx   Anterior right frontal scalp hematoma   Multiple nasal bone fractures   Right shoulder nondisplaced, minimally comminutes acromial fracture        Assessment & Plan   CAP CT: no acute injuries     Neuro/Pain/Psych:  # Found unconscious at bottom of stairs, +LOC,   - Initial Head CT @ 2157: Multiple small hemorrhagic contusions inferior frontal lobes along with tiny 2 mm subdural hematoma along the anterior inferior falx.   - Repeat Head CT @ 0528:  Evolving multicompartmental ICH. Grossly stable bifrontal intraparenchymal contusions with a small/thin subdural hematoma along the anterior/inferior falx. Question new intraparenchymal contusion involving the left anterior/inferior temporal lobe measuring up to 0.8 cm.  Question thin subdural hematoma along the right proximal tentorial leaflet measuring 2 mm, likely due to redistribution.  - 2nd repeat Head CT stable.  - Neurosurgery following     # Acute traumatic pain   - Schedule: tylenol,       # Alcohol Abuse  # Acute alcohol intoxication  - ETOH @ 2042: 0.27  - CIWA in place  - Schedule: clonidine, folic acid, Gabapentin taper, MVI, Thiamine taper  - Prn: Zyprexa/ haldol  - Holding benzos at this time to not confound neurochecks     EENT:  # Nasal Bone fractures   # Dental disease, seen on CT  # s/p bilateral cataract surgery 2012  - Facial CT:  Multiple nasal bone fractures. Prominent anterior right frontal scalp hematoma extending to bilateral preseptal soft tissue swelling extending to the soft tissue anterior to the mandible and maxilla. Prominent dental disease   - Keep upright as much as able to help with swelling  - Nasal precautions: no nose blowing, sneeze with mouth  open, no heavy listing or straws  - plan Dental hy    Pulmonary:  # COPD  # DAPHNEY  - Supplemental oxygen to keep saturation above 92 %.  - Incentive spirometer while awake   - continue     Cardiovascular:    # Hypertension   # s/p CABG   # s/p bioprosthetic aortic valve replacement   - Monitor hemodynamic status.   - continue PTA: carvedilol, lisinopril     GI/Nutrition:    - npo until stable Head CT    Renal/ Fluids/Electrolytes:  # Hyponatremia   - Na: 129  - Patient currently getting a banana bag, will started IVF NS after, repeat labs at 1200  - Creatinine 0.71  - electrolyte replacement protocol in place.     Endocrine:  - No management indication.     Infectious disease:   -  No indications for antibiotics.     Hematology:    # Acute blood loss anemia   - Hgb: 13.6?12.6. Monitor and trend.   - Threshold for transfusion if hgb <7.0 or signs/symptoms of hypoperfusion.       Musculoskeletal:  # s/p arthroscopy left shoulder rotator cuff repair   # Weakness and deconditioning of chronic illness   - Physical and occupational therapy consults.    Skin:  # Ecchymosis   - dilgent cares to prevent skin breakdown and wound formation.      Lines/ tubes/ drains:  - PIV     General Cares:    PPI/H2 blocker:  NA   DVT prophylaxis: pcd   Bowel Regimen/Date of last stool: in place    Pulmonary toilet: deep cough   ETOH screen completed yes, positive                Frailty Score: 0    Code status:  Full Code     Discharge goals:   Adequate pain management: in process  VSS x24 hours: NA  Hemoglobin stable x 48 hours: NA  Ambulating safely and/or therapy evals complete: NA  Drains/lines removed or plan in place to manage: yes  Teaching done: in process  Other:  Expected D/C date: ~2 days     Kusum Amador PA-C  To contact the trauma service use job code pager [phone],   Numeric texts or alpha text through [facility]      Interval History   Review of Systems   Skin: positive for negative, bruising, lumps or bumps  Eyes: positive for  edema   Ears/Nose/Throat: positive for epistaxis, nasal fractures, edema   Respiratory: No shortness of breath, dyspnea on exertion, cough, or hemoptysis  Cardiovascular: negative  Gastrointestinal: negative  Genitourinary: negative  Musculoskeletal: negative  Neurologic: positive for memory problems  Psychiatric: positive for excessive alcohol consumption  Hematologic/Lymphatic/Immunologic: negative  Endocrine: negative     Physical Exam   [place] Coma Scale - Total 14/15  Eye Response (E): 3   4= spontaneous, 3= to verbal/voice, 2= to pain, 1= No response   Verbal Response (V): 5   5= Orientated, converses, 4= Confused, converses, 3= Inappropriate words, 2= Incomprehensible sounds, 1=No response   Motor Response (M): 6   6= Obeys commands, 5= Localizes to pain, 4= Withdrawal to pain, 3=Fexion to pain, 2= Extension to pain, 1= No response     Frailty Questionnaire: To be done for all patients age 60+  F (Fatigue): Is the patient easily fatigued? NO = 0  R (Resistance): Is the patient unable to walk one flight of stairs? NO = 0  A (Ambulation): Is the patient unable to walk one block? NO = 0  I  (Illness): Does the patient have more than five illnesses? NO = 0  L (Loss of weight): Has the patient lost more than 5% of weight in the past 6 months. NO = 0  Lost five pounds or more in the last 3 months without trying? AND/OR Unintended weight loss?  Does the patient have difficulty performing housework such as washing windows or scrubbing floors? AND Activity in a typical 24-hour day- No moderate or vigorous activity    Score: 0    Score: 0-2: Ensure appropriate therapies consulted if needed     Physical Exam  Constitutional: Lethargic, cooperative, no apparent distress.  Eyes: Bilateral periorbital edema and ecchymosis, PERRL,   HENT: Normocephalic, atraumatic  Respiratory: No increased work of breathing, good air exchange,   Cardiovascular:  regular rate and rhythm,   GI: Abdomen soft, non-distended, non-tender, no  guarding  Genitourinary:  voids independently   Skin:  Warm & dry  Musculoskeletal: There is no redness, warmth, or swelling of the joints.    Neurologic: Awake, alert, oriented. Strength and sensory is intact. No new focal deficits.  Neuropsychiatric: Calm, normal eye contact, alert, affect appropriate to situation, oriented, thought process normal.        BP: (!) 135/117 Pulse: 71   Resp: 15 SpO2: 95 % O2 Device: None (Room air)    There were no vitals filed for this visit.  Vital Signs with Ranges  Temp:  [98  F (36.7  C)] 98  F (36.7  C)  Pulse:  [63-85] 71  Resp:  [11-24] 15  BP: ()/() 135/117  SpO2:  [93 %-97 %] 95 %  No intake/output data recorded.

## 2023-08-05 NOTE — CONSULTS
"[facility]       NEUROSURGERY CONSULTATION    This consultation was requested by Dr. Bowles from the ED service.    Reason for Consultation: SDH, IPH    HPI: Sunny Inman is a 78 year old male with a past medical history of COPD, HTN, HLD, DAPHNEY, coronary artery bypass, and bioprosthetic aortic valve replacement on daily 81 mg aspirin who was seen for IPH/SDH.  The patient reports that earlier today while he was at home he \"had a bit too much to drink\" and fell down a set of stairs.  This occurred around 5 PM.  No loss of consciousness but he did hit his head and face multiple times along the way.  He initially presented to [facility], and there was found to have multiple small hemorrhagic contusions in the inferior frontal lobes as well as a probable 2 mm subdural hematoma along the anterior inferior falx.  He was additionally found to have multiple nasal bone fractures and a nondisplaced, comminuted right acromial fracture.  CT C-spine negative.  CT CAP negative. He was transferred to [facility] for higher level of care given intracranial hemorrhage.    His only current complaint is headache.  No diplopia, no blurry vision, no nausea, no vomiting, no nasal discharge.      PAST MEDICAL HISTORY:   Past Medical History:   Diagnosis Date    Chronic obstructive pulmonary disease (H) 5/11/2022    Heart disease     Hypertension     Obstructive sleep apnea syndrome 5/11/2022    Sleep apnea     CPAP       PAST SURGICAL HISTORY:   Past Surgical History:   Procedure Laterality Date    ARTHROSCOPY SHOULDER ROTATOR CUFF REPAIR Left 3/27/2018    Procedure: ARTHROSCOPY SHOULDER ROTATOR CUFF REPAIR;  Left Shoulder Arthroscopic Subacromial Decompression, Distal Clavicle Excision;  Surgeon: Alex Pacheco MD;  Location: [facility] OR    PHACOEMULSIFICATION WITH STANDARD INTRAOCULAR LENS IMPLANT  11/15/2012    Procedure: PHACOEMULSIFICATION WITH STANDARD INTRAOCULAR LENS IMPLANT;  Right " kelman phacoemulsification with intraocular lens implant;  Surgeon: Aaron Salas MD;  Location: [facility] OR    PHACOEMULSIFICATION WITH STANDARD INTRAOCULAR LENS IMPLANT  11/29/2012    Procedure: PHACOEMULSIFICATION WITH STANDARD INTRAOCULAR LENS IMPLANT;  Left Kelman phacoemulsification with intraocular lens implant;  Surgeon: Aaron Salas MD;  Location: [facility] OR       FAMILY HISTORY:   Family History   Problem Relation Age of Onset    Heart Disease Father     Heart Surgery Father        SOCIAL HISTORY:   Social History     Tobacco Use    Smoking status: Former     Packs/day: 1.00     Years: 40.00     Pack years: 40.00     Types: Cigarettes    Smokeless tobacco: Not on file    Tobacco comments:     Quit 8 years ago   Substance Use Topics    Alcohol use: Yes     Comment: average of 6 beers per day       MEDICATIONS:    Current Outpatient Medications   Medication Instructions    albuterol (PROAIR HFA/PROVENTIL HFA/VENTOLIN HFA) 108 (90 Base) MCG/ACT inhaler Inhalation    ASPIRIN PO 81 mg, Oral    carvedilol (COREG) 12.5 mg, Oral, 2 TIMES DAILY WITH MEALS    lisinopril (ZESTRIL) 10 MG tablet 0.5 tablets, Oral, DAILY    tiotropium (SPIRIVA RESPIMAT) 2.5 MCG/ACT inhaler 2 puffs, Inhalation, DAILY         Allergies:  Allergies   Allergen Reactions    Nkda [No Known Drug Allergy]        Physical exam:   Blood pressure 128/81, pulse 76, resp. rate 16, SpO2 93 %.  CV: no peripheral edema, extremities well-perfused  PULM: breathing comfortably on room air  ABD: soft, non-distended  HEENT: bilateral periorbital ecchymosis, forehead laceration, forehead hematoma R > L  NEUROLOGIC:  -- Awake; Alert; oriented x 3; becomes mildly confused with more complex conversation  -- Follows commands briskly  -- +repetition, calculation, and naming  -- Speech fluent, spontaneous. No aphasia or dysarthria.  -- no gaze preference. No apparent hemineglect.  Cranial Nerves:  -- visual fields full to confrontation, PERRL 3-2mm bilat and  brisk, extraocular movements intact  -- face symmetrical, tongue midline  -- sensory V1-V3 intact bilaterally  -- palate elevates symmetrically, uvula midline  -- hearing grossly intact bilat  -- Trapezii 5/5 strength bilat symmetric  -- Cerebellar: Finger nose finger without dysmetria, intact rapid alternating motions bilaterally    Motor:  Normal bulk / tone; no tremor, rigidity, or bradykinesia.  No muscle wasting or fasciculations  No Pronator Drift     Delt Bi Tri Hand Flexion/  Extension Iliopsoas Quadriceps Hamstrings Tibialis Anterior Gastroc    C5 C6 C7 C8/T1 L2 L3 L4-S1 L4 S1   R 5 5 5 5 5 5 5 5 5   L 5 5 5 5 5 5 5 5 5   Sensory:  intact to LT x 4 extremities     Reflexes:     Bi Tri BR Sara Pat Ach Bab    C5-6 C7-8 C6 UMN L2-4 S1 UMN   R 2+ 2+ 2+ Norm 2+ 2+ Norm   L 2+ 2+ 2+ Norm 2+ 2+ Norm         LABS:  Sodium   Date Value Ref Range Status   08/04/2023 129 (L) 136 - 145 mmol/L Final     Potassium   Date Value Ref Range Status   08/04/2023 4.0 3.4 - 5.3 mmol/L Final   05/12/2022 4.2 3.4 - 5.3 mmol/L Final     Chloride   Date Value Ref Range Status   08/04/2023 94 (L) 98 - 107 mmol/L Final   05/12/2022 103 94 - 109 mmol/L Final     Carbon Dioxide (CO2)   Date Value Ref Range Status   08/04/2023 21 (L) 22 - 29 mmol/L Final   05/12/2022 27 20 - 32 mmol/L Final     Anion Gap   Date Value Ref Range Status   08/04/2023 14 7 - 15 mmol/L Final   05/12/2022 6 3 - 14 mmol/L Final     Glucose   Date Value Ref Range Status   08/04/2023 97 70 - 99 mg/dL Final   05/12/2022 141 (H) 70 - 99 mg/dL Final     Urea Nitrogen   Date Value Ref Range Status   08/04/2023 10.7 8.0 - 23.0 mg/dL Final   05/12/2022 16 7 - 30 mg/dL Final     Creatinine   Date Value Ref Range Status   08/04/2023 0.71 0.67 - 1.17 mg/dL Final     GFR Estimate   Date Value Ref Range Status   08/04/2023 >90 >60 mL/min/1.73m2 Final     Calcium   Date Value Ref Range Status   08/04/2023 8.4 (L) 8.8 - 10.2 mg/dL Final     Lab Results   Component Value Date     WBC 5.3 08/04/2023     Lab Results   Component Value Date    RBC 3.99 08/04/2023     Lab Results   Component Value Date    HGB 13.6 08/04/2023     Lab Results   Component Value Date     08/04/2023     INR   Date Value Ref Range Status   08/04/2023 1.02 0.85 - 1.15 Final     IMAGING:  Recent Results (from the past 24 hour(s))   CT Cervical Spine w/o Contrast    Narrative    EXAM: CT FACIAL BONES WITHOUT CONTRAST, CT HEAD W/O CONTRAST, CT CERVICAL SPINE W/O CONTRAST  LOCATION: [facility]  DATE: 8/4/2023    INDICATION: fall down stairs; facial injury altered mental status and neck pain and facial pain.  COMPARISON: None.  TECHNIQUE:   1) Routine CT Head without IV contrast. Multiplanar reformats. Dose reduction techniques were used.  2) Routine CT Facial Bones without IV contrast. Multiplanar reformats. Dose reduction techniques were used.  3) Routine CT Cervical Spine without IV contrast. Multiplanar reformats. Dose reduction techniques were used.    FINDINGS:  HEAD CT:   INTRACRANIAL CONTENTS: Small hemorrhagic contusions involving the inferior frontal lobes bilaterally and a probable tiny subdural hematoma of about 2 mm along the anterior inferior falx. There is no evidence of a midline shift. No CT evidence of acute   infarct. There is scattered diffuse low attenuation within the periventricular and subcortical white matter consistent with diffuse small vessel ischemic disease. The ventricular system, basal cisterns and the cortical sulci are consistent with mild   diffuse volume loss.     OSSEOUS STRUCTURES/SOFT TISSUES: Calvarium is intact. There is a prominent anterior right frontal scalp hematoma.     FACIAL BONE CT:  OSSEOUS STRUCTURES/SOFT TISSUES: There is soft tissue swelling anterior to the mandible extending up to the maxilla along with the right frontal scalp hematoma extending to the nasal bridge and the bilateral preseptal soft tissues. The mandible is   intact.  The temporomandibular joints have good anatomic alignment. No other discrete facial fracture is visualized. There are periapical lucencies involving the posterior mandibular molars. There are also periapical lucencies premolars of the mandible   bilaterally and the teeth are both cracked. Of note there is a dentigerous cyst associated with the left maxillary second premolar. Recommend clinical correlation for dental disease.    ORBITAL CONTENTS: Besides the preseptal soft tissue swelling the orbit regions are unremarkable.    SINUSES: Mild mucosal thickening ethmoid air cells and the maxillary sinuses bilaterally.    CERVICAL SPINE CT:   VERTEBRA: There is a slight anterior listhesis of C2 in relation to C3 and C7 in relation to T1. The vertebral body heights are well-maintained throughout. No fracture or posttraumatic subluxation.     CANAL/FORAMINA: There is diffuse degenerative disc disease from the C3-C4 to the C7-T1 disc space level. These levels have a moderate loss of disc space height, endplate changes and small anterior osteophyte formations. Mild diffuse facet arthropathy.    No significant canal compromise or neural foraminal narrowing noted throughout the cervical spine.    PARASPINAL: No extraspinal abnormality. Visualized lung fields are clear.      Impression    IMPRESSION:  HEAD CT:  1.  Multiple small hemorrhagic contusions inferior frontal lobes along with tiny 2 mm subdural hematoma along the anterior inferior falx.  2.  Mild diffuse age related changes.    FACIAL BONE CT:  1.  Multiple nasal bone fractures.  2.  Prominent anterior right frontal scalp hematoma extending to bilateral preseptal soft tissue swelling extending to the soft tissue anterior to the mandible and maxilla.  3.  Prominent dental disease as described above.    CERVICAL SPINE CT:  1.  No fracture or posttraumatic subluxation.  2.  No high-grade spinal canal or neural foraminal stenosis.   CT Facial Bones without Contrast     Narrative    EXAM: CT FACIAL BONES WITHOUT CONTRAST, CT HEAD W/O CONTRAST, CT CERVICAL SPINE W/O CONTRAST  LOCATION: [facility]  DATE: 8/4/2023    INDICATION: fall down stairs; facial injury altered mental status and neck pain and facial pain.  COMPARISON: None.  TECHNIQUE:   1) Routine CT Head without IV contrast. Multiplanar reformats. Dose reduction techniques were used.  2) Routine CT Facial Bones without IV contrast. Multiplanar reformats. Dose reduction techniques were used.  3) Routine CT Cervical Spine without IV contrast. Multiplanar reformats. Dose reduction techniques were used.    FINDINGS:  HEAD CT:   INTRACRANIAL CONTENTS: Small hemorrhagic contusions involving the inferior frontal lobes bilaterally and a probable tiny subdural hematoma of about 2 mm along the anterior inferior falx. There is no evidence of a midline shift. No CT evidence of acute   infarct. There is scattered diffuse low attenuation within the periventricular and subcortical white matter consistent with diffuse small vessel ischemic disease. The ventricular system, basal cisterns and the cortical sulci are consistent with mild   diffuse volume loss.     OSSEOUS STRUCTURES/SOFT TISSUES: Calvarium is intact. There is a prominent anterior right frontal scalp hematoma.     FACIAL BONE CT:  OSSEOUS STRUCTURES/SOFT TISSUES: There is soft tissue swelling anterior to the mandible extending up to the maxilla along with the right frontal scalp hematoma extending to the nasal bridge and the bilateral preseptal soft tissues. The mandible is   intact. The temporomandibular joints have good anatomic alignment. No other discrete facial fracture is visualized. There are periapical lucencies involving the posterior mandibular molars. There are also periapical lucencies premolars of the mandible   bilaterally and the teeth are both cracked. Of note there is a dentigerous cyst associated with the left maxillary second  premolar. Recommend clinical correlation for dental disease.    ORBITAL CONTENTS: Besides the preseptal soft tissue swelling the orbit regions are unremarkable.    SINUSES: Mild mucosal thickening ethmoid air cells and the maxillary sinuses bilaterally.    CERVICAL SPINE CT:   VERTEBRA: There is a slight anterior listhesis of C2 in relation to C3 and C7 in relation to T1. The vertebral body heights are well-maintained throughout. No fracture or posttraumatic subluxation.     CANAL/FORAMINA: There is diffuse degenerative disc disease from the C3-C4 to the C7-T1 disc space level. These levels have a moderate loss of disc space height, endplate changes and small anterior osteophyte formations. Mild diffuse facet arthropathy.    No significant canal compromise or neural foraminal narrowing noted throughout the cervical spine.    PARASPINAL: No extraspinal abnormality. Visualized lung fields are clear.      Impression    IMPRESSION:  HEAD CT:  1.  Multiple small hemorrhagic contusions inferior frontal lobes along with tiny 2 mm subdural hematoma along the anterior inferior falx.  2.  Mild diffuse age related changes.    FACIAL BONE CT:  1.  Multiple nasal bone fractures.  2.  Prominent anterior right frontal scalp hematoma extending to bilateral preseptal soft tissue swelling extending to the soft tissue anterior to the mandible and maxilla.  3.  Prominent dental disease as described above.    CERVICAL SPINE CT:  1.  No fracture or posttraumatic subluxation.  2.  No high-grade spinal canal or neural foraminal stenosis.   CT Chest/Abdomen/Pelvis w Contrast    Narrative    EXAM: CT CHEST/ABDOMEN/PELVIS W CONTRAST  LOCATION: [facility]  DATE: 8/4/2023    INDICATION: intoxicated; fell down stairs.  COMPARISON: None.  TECHNIQUE: CT scan of the chest, abdomen, and pelvis was performed following injection of IV contrast. Multiplanar reformats were obtained. Dose reduction techniques were used.    CONTRAST: 83mL of Isovue 370    FINDINGS:   LUNGS AND PLEURA: Moderate centrilobular emphysema. Calcified granulomas.    MEDIASTINUM/AXILLAE: 4.6 cm dilatation ascending thoracic aorta. Heart size normal. Aortic valve prosthesis. Calcified mediastinal and hilar lymph nodes.    CORONARY ARTERY CALCIFICATION: Mild.    HEPATOBILIARY: Normal.    PANCREAS: Normal.    SPLEEN: Granulomatous calcifications.    ADRENAL GLANDS: Normal.    KIDNEYS/BLADDER: Bladder distention.    BOWEL: Normal. No free air or free fluid.    LYMPH NODES: Normal.    VASCULATURE: Atherosclerosis. Ectasia abdominal aorta.    PELVIC ORGANS: Normal.    MUSCULOSKELETAL: Sternotomy.      Impression    IMPRESSION:  1.  No evidence of acute traumatic injury.   CT Head w/o Contrast    Narrative    EXAM: CT FACIAL BONES WITHOUT CONTRAST, CT HEAD W/O CONTRAST, CT CERVICAL SPINE W/O CONTRAST  LOCATION: [facility]  DATE: 8/4/2023    INDICATION: fall down stairs; facial injury altered mental status and neck pain and facial pain.  COMPARISON: None.  TECHNIQUE:   1) Routine CT Head without IV contrast. Multiplanar reformats. Dose reduction techniques were used.  2) Routine CT Facial Bones without IV contrast. Multiplanar reformats. Dose reduction techniques were used.  3) Routine CT Cervical Spine without IV contrast. Multiplanar reformats. Dose reduction techniques were used.    FINDINGS:  HEAD CT:   INTRACRANIAL CONTENTS: Small hemorrhagic contusions involving the inferior frontal lobes bilaterally and a probable tiny subdural hematoma of about 2 mm along the anterior inferior falx. There is no evidence of a midline shift. No CT evidence of acute   infarct. There is scattered diffuse low attenuation within the periventricular and subcortical white matter consistent with diffuse small vessel ischemic disease. The ventricular system, basal cisterns and the cortical sulci are consistent with mild   diffuse volume loss.     OSSEOUS  STRUCTURES/SOFT TISSUES: Calvarium is intact. There is a prominent anterior right frontal scalp hematoma.     FACIAL BONE CT:  OSSEOUS STRUCTURES/SOFT TISSUES: There is soft tissue swelling anterior to the mandible extending up to the maxilla along with the right frontal scalp hematoma extending to the nasal bridge and the bilateral preseptal soft tissues. The mandible is   intact. The temporomandibular joints have good anatomic alignment. No other discrete facial fracture is visualized. There are periapical lucencies involving the posterior mandibular molars. There are also periapical lucencies premolars of the mandible   bilaterally and the teeth are both cracked. Of note there is a dentigerous cyst associated with the left maxillary second premolar. Recommend clinical correlation for dental disease.    ORBITAL CONTENTS: Besides the preseptal soft tissue swelling the orbit regions are unremarkable.    SINUSES: Mild mucosal thickening ethmoid air cells and the maxillary sinuses bilaterally.    CERVICAL SPINE CT:   VERTEBRA: There is a slight anterior listhesis of C2 in relation to C3 and C7 in relation to T1. The vertebral body heights are well-maintained throughout. No fracture or posttraumatic subluxation.     CANAL/FORAMINA: There is diffuse degenerative disc disease from the C3-C4 to the C7-T1 disc space level. These levels have a moderate loss of disc space height, endplate changes and small anterior osteophyte formations. Mild diffuse facet arthropathy.    No significant canal compromise or neural foraminal narrowing noted throughout the cervical spine.    PARASPINAL: No extraspinal abnormality. Visualized lung fields are clear.      Impression    IMPRESSION:  HEAD CT:  1.  Multiple small hemorrhagic contusions inferior frontal lobes along with tiny 2 mm subdural hematoma along the anterior inferior falx.  2.  Mild diffuse age related changes.    FACIAL BONE CT:  1.  Multiple nasal bone fractures.  2.   Prominent anterior right frontal scalp hematoma extending to bilateral preseptal soft tissue swelling extending to the soft tissue anterior to the mandible and maxilla.  3.  Prominent dental disease as described above.    CERVICAL SPINE CT:  1.  No fracture or posttraumatic subluxation.  2.  No high-grade spinal canal or neural foraminal stenosis.   Shoulder XR, 2 views, bilateral    Narrative    EXAM: XR SHOULDER BILATERAL G/E 2 VIEWS  LOCATION: [facility]  DATE: 8/4/2023    INDICATION: Bilateral shoulder pain after fall.  COMPARISON: None.      Impression    IMPRESSION:   Right shoulder: Nondisplaced, minimally comminuted acromial fracture. No evidence of additional acute fracture. No dislocation. Soft tissues grossly unremarkable. Mild degenerative changes of the glenohumeral joint. Narrowing of the subacromial space,   likely due to underlying rotator cuff pathology. Calcified right hilar lymph nodes, compatible with old granulomatous disease.    Left shoulder: No acute fracture or dislocation. Mild degenerative changes of the glenohumeral joint. Soft tissues grossly unremarkable. Calcified left hilar lymph nodes, compatible with old granulomatous disease       ASSESSMENT:  Sunny Inman is a 78 year old male on daily 81mg aspirin presenting with nasal bone fractures, acromial fracture, small hemorrhagic bifrontal contusions, and questionable subdural hematoma after an intoxicated fall.  No indication for neurosurgical intervention identified, but he should be closely monitored with serial neurologic exams and repeat CT scan.    Clinically Significant Risk Factors Present on Admission         # Hyponatremia: Lowest Na = 129 mmol/L in last 2 days, will monitor as appropriate  # Hypocalcemia: Lowest Ca = 8.4 mg/dL in last 2 days, will monitor and replace as appropriate         # Drug Induced Platelet Defect: home medication list includes an antiplatelet medication     # Hypertension:  Home medication list includes antihypertensive(s)              RECOMMENDATIONS:    -No neurosurgical intervention indicated at this time   -Hold aspirin if otherwise medically safe; may need to discuss safety of cessation with cardiology given patient's history of valve replacement and CABG  -Repeat head CT in 6 hours from the time of first acquisition  -q1h neuro exams until stable scan; q2h neuro exams thereafter  -Maintain SBP < 140   -Cautiously correct Na to normonatremia  -NPO until stable scan  -Platelets > 100,000  -INR < 1.5    Carmen Becerra MD, PhD  PGY-2 Neurosurgery    Please contact neurosurgery resident on call with questions.    Dial * * [phone], enter [phone] when prompted.

## 2023-08-05 NOTE — ED PROVIDER NOTES
History     Chief Complaint   Patient presents with    Fall     HPI  Sunny Inman is a 78 year old male who was brought in by EMS from home after his wife found him at the bottom of the stairs after a fall.  He has a history of alcohol abuse and says he has been drinking heavily today.  He fell down the stairs as reported by his wife but he does not remember this happening.  He states he does not know why he is here.  He currently denies headache, neck pain, chest pain, abdominal pain, other injuries.  He walked in unassisted.  He gets all his care at the [facility].  He admits to having had a fall about a week ago with injury to the right shoulder and chest and [facility] that [facility].  He is taking low-dose aspirin as well as lisinopril but no anticoagulants.    Allergies:  Allergies   Allergen Reactions    Nkda [No Known Drug Allergy]        Problem List:    Patient Active Problem List    Diagnosis Date Noted    Personal history of tobacco use, presenting hazards to health 05/12/2022     Priority: Medium    Encounter for screening laboratory testing for severe acute respiratory syndrome coronavirus 2 (SARS-CoV-2) 05/12/2022     Priority: Medium    Thoracic aortic aneurysm without rupture (H) 05/11/2022     Priority: Medium     Nov 17, 2015 Entered By: MABEL HOLDER Comment: noted on chest CT, follow up 11/2016 with thoracic CTA      Obstructive sleep apnea syndrome 05/11/2022     Priority: Medium     Oct 08, 2015 Entered By: MABEL HOLDER Comment: AHI 21      Hyperlipidemia 05/11/2022     Priority: Medium    Heart valve transplanted 05/11/2022     Priority: Medium     Oct 14, 2019 Entered By: UMANG CARRASCO Comment: aortic      Former smoker 05/11/2022     Priority: Medium    Disorder involving thrombocytopenia (H) 05/11/2022     Priority: Medium    Coronary artery disease 05/11/2022     Priority: Medium    Congestive heart failure (H) 05/11/2022     Priority: Medium      May 12, 2017 Entered By: BRITTNEY LIMA Comment: Enrolled in CHF clinic. Sandra Ibarra, RN Case Manager. Call ext [phone] for appt.      Chronic obstructive pulmonary disease (H) 05/11/2022     Priority: Medium     Feb 24, 2011 Entered By: MABEL HOLDER Comment: Spirometry deomonstrates moderate obstruction      Benign prostatic hyperplasia 05/11/2022     Priority: Medium    Aortic stenosis 05/11/2022     Priority: Medium     May 18, 2016 Entered By: MABEL HOLDER Comment: dyspnea, echo: EF 35%, severe aortic stenosis      Alcohol abuse 05/11/2022     Priority: Medium    COPD exacerbation (H) 05/11/2022     Priority: Medium    Senile nuclear sclerosis 11/13/2012     Priority: Medium        Past Medical History:    Past Medical History:   Diagnosis Date    Chronic obstructive pulmonary disease (H) 5/11/2022    Heart disease     Hypertension     Obstructive sleep apnea syndrome 5/11/2022    Sleep apnea        Past Surgical History:    Past Surgical History:   Procedure Laterality Date    ARTHROSCOPY SHOULDER ROTATOR CUFF REPAIR Left 3/27/2018    Procedure: ARTHROSCOPY SHOULDER ROTATOR CUFF REPAIR;  Left Shoulder Arthroscopic Subacromial Decompression, Distal Clavicle Excision;  Surgeon: Alex Pacheco MD;  Location: [facility] OR    PHACOEMULSIFICATION WITH STANDARD INTRAOCULAR LENS IMPLANT  11/15/2012    Procedure: PHACOEMULSIFICATION WITH STANDARD INTRAOCULAR LENS IMPLANT;  Right kelman phacoemulsification with intraocular lens implant;  Surgeon: Aaron Salas MD;  Location: [facility] OR    PHACOEMULSIFICATION WITH STANDARD INTRAOCULAR LENS IMPLANT  11/29/2012    Procedure: PHACOEMULSIFICATION WITH STANDARD INTRAOCULAR LENS IMPLANT;  Left Kelman phacoemulsification with intraocular lens implant;  Surgeon: Aaron Salas MD;  Location: [facility] OR       Family History:    Family History   Problem Relation Age of Onset    Heart Disease Father     Heart Surgery Father        Social History:  Marital Status:    [2]  Social History     Tobacco Use    Smoking status: Former     Packs/day: 1.00     Years: 40.00     Pack years: 40.00     Types: Cigarettes    Tobacco comments:     Quit 8 years ago   Substance Use Topics    Alcohol use: Yes     Comment: average of 6 beers per day    Drug use: Not Currently        Medications:    albuterol (PROAIR HFA/PROVENTIL HFA/VENTOLIN HFA) 108 (90 Base) MCG/ACT inhaler  ASPIRIN PO  carvedilol (COREG) 25 MG tablet  lisinopril (ZESTRIL) 10 MG tablet  tiotropium (SPIRIVA RESPIMAT) 2.5 MCG/ACT inhaler          Review of Systems  All other systems are reviewed and are negative    Physical Exam   BP: (!) 162/84  Pulse: 63  Temp: 98  F (36.7  C)  Resp: 18  SpO2: 95 %      Physical Exam    Nursing note and vitals were reviewed.  Constitutional: Awake and alert, adequately nourished and developed appearing 78-year-old in no apparent discomfort, who does not appear acutely ill, and who answers questions appropriately and cooperates with examination.  Acetone ordered to the breath  HEENT: Ecchymosis are present over most of the face including both periorbital regions and the malar eminences and around the mouth and nose but voice quality is normal.  Lids and lashes are intact.  Conjunctivae are intact.  Cornea is normal.  Anterior chamber is clear.  PERRL EOMI. no proptosis or enophthalmos  Neck: Freely mobile.  No pain with range of motion of the neck.  No cervical spine tenderness to palpation or percussion.  Cardiovascular: Cardiac examination reveals normal heart rate and regular rhythm without murmur.  Pulmonary/Chest: Breathing is unlabored.  Breath sounds are clear and equal bilaterally.  There no retractions, tachypnea, rales, wheezes, or rhonchi.  Old appearing ecchymoses on the right upper chest and right shoulder with normal motion of the right shoulder and no tenderness over the chest wall with no subcutaneous emphysema and no crepitus.  Abdomen: Soft, nontender, no HSM or masses rebound  or guarding.  Musculoskeletal: Extremities are warm and well-perfused and without edema.  Full pain-free range of motion at the hips knees and ankles bilaterally.  Full pain-free range of motion of the shoulders elbows and wrists bilaterally.  Neurological: Alert, oriented, thought content logical, coherent   Skin: Warm, dry, no rashes.  Psychiatric: Affect intoxicated but able to converse with fluent speech and able to focus his attention.    ED Course                 Procedures              Critical Care time:  none               Results for orders placed or performed during the hospital encounter of 08/04/23 (from the past 24 hour(s))   [place] Draw    Narrative    The following orders were created for panel order [place] Draw.  Procedure                               Abnormality         Status                     ---------                               -----------         ------                     Extra Blue Top Tube[[id number]]                              Final result               Extra Red Top Tube[[id number]]                               Final result               Extra Green Top (Lithium...[[id number]]                      Final result               Extra Purple Top Tube[[id number]]                            Final result                 Please view results for these tests on the individual orders.   Extra Blue Top Tube   Result Value Ref Range    Hold Specimen JIC    Extra Red Top Tube   Result Value Ref Range    Hold Specimen JIC    Extra Green Top (Lithium Heparin) Tube   Result Value Ref Range    Hold Specimen JIC    Extra Purple Top Tube   Result Value Ref Range    Hold Specimen JIC    CBC with platelets differential    Narrative    The following orders were created for panel order CBC with platelets differential.  Procedure                               Abnormality         Status                     ---------                               -----------         ------                     CBC with  platelets and d...[[id number]]  Abnormal            Final result                 Please view results for these tests on the individual orders.   Comprehensive metabolic panel   Result Value Ref Range    Sodium 129 (L) 136 - 145 mmol/L    Potassium 4.0 3.4 - 5.3 mmol/L    Chloride 94 (L) 98 - 107 mmol/L    Carbon Dioxide (CO2) 21 (L) 22 - 29 mmol/L    Anion Gap 14 7 - 15 mmol/L    Urea Nitrogen 10.7 8.0 - 23.0 mg/dL    Creatinine 0.71 0.67 - 1.17 mg/dL    Calcium 8.4 (L) 8.8 - 10.2 mg/dL    Glucose 97 70 - 99 mg/dL    Alkaline Phosphatase 65 40 - 129 U/L    AST 26 0 - 45 U/L    ALT 17 0 - 70 U/L    Protein Total 6.5 6.4 - 8.3 g/dL    Albumin 4.0 3.5 - 5.2 g/dL    Bilirubin Total 0.4 <=1.2 mg/dL    GFR Estimate >90 >60 mL/min/1.73m2   Ethyl Alcohol Level   Result Value Ref Range    Alcohol ethyl 0.27 (H) <=0.01 g/dL   CBC with platelets and differential   Result Value Ref Range    WBC Count 5.3 4.0 - 11.0 10e3/uL    RBC Count 3.99 (L) 4.40 - 5.90 10e6/uL    Hemoglobin 13.6 13.3 - 17.7 g/dL    Hematocrit 39.5 (L) 40.0 - 53.0 %    MCV 99 78 - 100 fL    MCH 34.1 (H) 26.5 - 33.0 pg    MCHC 34.4 31.5 - 36.5 g/dL    RDW 12.3 10.0 - 15.0 %    Platelet Count 132 (L) 150 - 450 10e3/uL    % Neutrophils 41 %    % Lymphocytes 42 %    % Monocytes 14 %    % Eosinophils 2 %    % Basophils 1 %    % Immature Granulocytes 0 %    NRBCs per 100 WBC 0 <1 /100    Absolute Neutrophils 2.2 1.6 - 8.3 10e3/uL    Absolute Lymphocytes 2.3 0.8 - 5.3 10e3/uL    Absolute Monocytes 0.7 0.0 - 1.3 10e3/uL    Absolute Eosinophils 0.1 0.0 - 0.7 10e3/uL    Absolute Basophils 0.0 0.0 - 0.2 10e3/uL    Absolute Immature Granulocytes 0.0 <=0.4 10e3/uL    Absolute NRBCs 0.0 10e3/uL   INR   Result Value Ref Range    INR 1.02 0.85 - 1.15   CT Cervical Spine w/o Contrast    Narrative    EXAM: CT FACIAL BONES WITHOUT CONTRAST, CT HEAD W/O CONTRAST, CT CERVICAL SPINE W/O CONTRAST  LOCATION: [facility]  DATE: 8/4/2023    INDICATION: fall  down stairs; facial injury altered mental status and neck pain and facial pain.  COMPARISON: None.  TECHNIQUE:   1) Routine CT Head without IV contrast. Multiplanar reformats. Dose reduction techniques were used.  2) Routine CT Facial Bones without IV contrast. Multiplanar reformats. Dose reduction techniques were used.  3) Routine CT Cervical Spine without IV contrast. Multiplanar reformats. Dose reduction techniques were used.    FINDINGS:  HEAD CT:   INTRACRANIAL CONTENTS: Small hemorrhagic contusions involving the inferior frontal lobes bilaterally and a probable tiny subdural hematoma of about 2 mm along the anterior inferior falx. There is no evidence of a midline shift. No CT evidence of acute   infarct. There is scattered diffuse low attenuation within the periventricular and subcortical white matter consistent with diffuse small vessel ischemic disease. The ventricular system, basal cisterns and the cortical sulci are consistent with mild   diffuse volume loss.     OSSEOUS STRUCTURES/SOFT TISSUES: Calvarium is intact. There is a prominent anterior right frontal scalp hematoma.     FACIAL BONE CT:  OSSEOUS STRUCTURES/SOFT TISSUES: There is soft tissue swelling anterior to the mandible extending up to the maxilla along with the right frontal scalp hematoma extending to the nasal bridge and the bilateral preseptal soft tissues. The mandible is   intact. The temporomandibular joints have good anatomic alignment. No other discrete facial fracture is visualized. There are periapical lucencies involving the posterior mandibular molars. There are also periapical lucencies premolars of the mandible   bilaterally and the teeth are both cracked. Of note there is a dentigerous cyst associated with the left maxillary second premolar. Recommend clinical correlation for dental disease.    ORBITAL CONTENTS: Besides the preseptal soft tissue swelling the orbit regions are unremarkable.    SINUSES: Mild mucosal thickening  ethmoid air cells and the maxillary sinuses bilaterally.    CERVICAL SPINE CT:   VERTEBRA: There is a slight anterior listhesis of C2 in relation to C3 and C7 in relation to T1. The vertebral body heights are well-maintained throughout. No fracture or posttraumatic subluxation.     CANAL/FORAMINA: There is diffuse degenerative disc disease from the C3-C4 to the C7-T1 disc space level. These levels have a moderate loss of disc space height, endplate changes and small anterior osteophyte formations. Mild diffuse facet arthropathy.    No significant canal compromise or neural foraminal narrowing noted throughout the cervical spine.    PARASPINAL: No extraspinal abnormality. Visualized lung fields are clear.      Impression    IMPRESSION:  HEAD CT:  1.  Multiple small hemorrhagic contusions inferior frontal lobes along with tiny 2 mm subdural hematoma along the anterior inferior falx.  2.  Mild diffuse age related changes.    FACIAL BONE CT:  1.  Multiple nasal bone fractures.  2.  Prominent anterior right frontal scalp hematoma extending to bilateral preseptal soft tissue swelling extending to the soft tissue anterior to the mandible and maxilla.  3.  Prominent dental disease as described above.    CERVICAL SPINE CT:  1.  No fracture or posttraumatic subluxation.  2.  No high-grade spinal canal or neural foraminal stenosis.   CT Facial Bones without Contrast    Narrative    EXAM: CT FACIAL BONES WITHOUT CONTRAST, CT HEAD W/O CONTRAST, CT CERVICAL SPINE W/O CONTRAST  LOCATION: [facility]  DATE: 8/4/2023    INDICATION: fall down stairs; facial injury altered mental status and neck pain and facial pain.  COMPARISON: None.  TECHNIQUE:   1) Routine CT Head without IV contrast. Multiplanar reformats. Dose reduction techniques were used.  2) Routine CT Facial Bones without IV contrast. Multiplanar reformats. Dose reduction techniques were used.  3) Routine CT Cervical Spine without IV contrast.  Multiplanar reformats. Dose reduction techniques were used.    FINDINGS:  HEAD CT:   INTRACRANIAL CONTENTS: Small hemorrhagic contusions involving the inferior frontal lobes bilaterally and a probable tiny subdural hematoma of about 2 mm along the anterior inferior falx. There is no evidence of a midline shift. No CT evidence of acute   infarct. There is scattered diffuse low attenuation within the periventricular and subcortical white matter consistent with diffuse small vessel ischemic disease. The ventricular system, basal cisterns and the cortical sulci are consistent with mild   diffuse volume loss.     OSSEOUS STRUCTURES/SOFT TISSUES: Calvarium is intact. There is a prominent anterior right frontal scalp hematoma.     FACIAL BONE CT:  OSSEOUS STRUCTURES/SOFT TISSUES: There is soft tissue swelling anterior to the mandible extending up to the maxilla along with the right frontal scalp hematoma extending to the nasal bridge and the bilateral preseptal soft tissues. The mandible is   intact. The temporomandibular joints have good anatomic alignment. No other discrete facial fracture is visualized. There are periapical lucencies involving the posterior mandibular molars. There are also periapical lucencies premolars of the mandible   bilaterally and the teeth are both cracked. Of note there is a dentigerous cyst associated with the left maxillary second premolar. Recommend clinical correlation for dental disease.    ORBITAL CONTENTS: Besides the preseptal soft tissue swelling the orbit regions are unremarkable.    SINUSES: Mild mucosal thickening ethmoid air cells and the maxillary sinuses bilaterally.    CERVICAL SPINE CT:   VERTEBRA: There is a slight anterior listhesis of C2 in relation to C3 and C7 in relation to T1. The vertebral body heights are well-maintained throughout. No fracture or posttraumatic subluxation.     CANAL/FORAMINA: There is diffuse degenerative disc disease from the C3-C4 to the C7-T1 disc  space level. These levels have a moderate loss of disc space height, endplate changes and small anterior osteophyte formations. Mild diffuse facet arthropathy.    No significant canal compromise or neural foraminal narrowing noted throughout the cervical spine.    PARASPINAL: No extraspinal abnormality. Visualized lung fields are clear.      Impression    IMPRESSION:  HEAD CT:  1.  Multiple small hemorrhagic contusions inferior frontal lobes along with tiny 2 mm subdural hematoma along the anterior inferior falx.  2.  Mild diffuse age related changes.    FACIAL BONE CT:  1.  Multiple nasal bone fractures.  2.  Prominent anterior right frontal scalp hematoma extending to bilateral preseptal soft tissue swelling extending to the soft tissue anterior to the mandible and maxilla.  3.  Prominent dental disease as described above.    CERVICAL SPINE CT:  1.  No fracture or posttraumatic subluxation.  2.  No high-grade spinal canal or neural foraminal stenosis.   CT Chest/Abdomen/Pelvis w Contrast    Narrative    EXAM: CT CHEST/ABDOMEN/PELVIS W CONTRAST  LOCATION: [facility]  DATE: 8/4/2023    INDICATION: intoxicated; fell down stairs.  COMPARISON: None.  TECHNIQUE: CT scan of the chest, abdomen, and pelvis was performed following injection of IV contrast. Multiplanar reformats were obtained. Dose reduction techniques were used.   CONTRAST: 83mL of Isovue 370    FINDINGS:   LUNGS AND PLEURA: Moderate centrilobular emphysema. Calcified granulomas.    MEDIASTINUM/AXILLAE: 4.6 cm dilatation ascending thoracic aorta. Heart size normal. Aortic valve prosthesis. Calcified mediastinal and hilar lymph nodes.    CORONARY ARTERY CALCIFICATION: Mild.    HEPATOBILIARY: Normal.    PANCREAS: Normal.    SPLEEN: Granulomatous calcifications.    ADRENAL GLANDS: Normal.    KIDNEYS/BLADDER: Bladder distention.    BOWEL: Normal. No free air or free fluid.    LYMPH NODES: Normal.    VASCULATURE: Atherosclerosis.  Ectasia abdominal aorta.    PELVIC ORGANS: Normal.    MUSCULOSKELETAL: Sternotomy.      Impression    IMPRESSION:  1.  No evidence of acute traumatic injury.   CT Head w/o Contrast    Narrative    EXAM: CT FACIAL BONES WITHOUT CONTRAST, CT HEAD W/O CONTRAST, CT CERVICAL SPINE W/O CONTRAST  LOCATION: [facility]  DATE: 8/4/2023    INDICATION: fall down stairs; facial injury altered mental status and neck pain and facial pain.  COMPARISON: None.  TECHNIQUE:   1) Routine CT Head without IV contrast. Multiplanar reformats. Dose reduction techniques were used.  2) Routine CT Facial Bones without IV contrast. Multiplanar reformats. Dose reduction techniques were used.  3) Routine CT Cervical Spine without IV contrast. Multiplanar reformats. Dose reduction techniques were used.    FINDINGS:  HEAD CT:   INTRACRANIAL CONTENTS: Small hemorrhagic contusions involving the inferior frontal lobes bilaterally and a probable tiny subdural hematoma of about 2 mm along the anterior inferior falx. There is no evidence of a midline shift. No CT evidence of acute   infarct. There is scattered diffuse low attenuation within the periventricular and subcortical white matter consistent with diffuse small vessel ischemic disease. The ventricular system, basal cisterns and the cortical sulci are consistent with mild   diffuse volume loss.     OSSEOUS STRUCTURES/SOFT TISSUES: Calvarium is intact. There is a prominent anterior right frontal scalp hematoma.     FACIAL BONE CT:  OSSEOUS STRUCTURES/SOFT TISSUES: There is soft tissue swelling anterior to the mandible extending up to the maxilla along with the right frontal scalp hematoma extending to the nasal bridge and the bilateral preseptal soft tissues. The mandible is   intact. The temporomandibular joints have good anatomic alignment. No other discrete facial fracture is visualized. There are periapical lucencies involving the posterior mandibular molars. There are  also periapical lucencies premolars of the mandible   bilaterally and the teeth are both cracked. Of note there is a dentigerous cyst associated with the left maxillary second premolar. Recommend clinical correlation for dental disease.    ORBITAL CONTENTS: Besides the preseptal soft tissue swelling the orbit regions are unremarkable.    SINUSES: Mild mucosal thickening ethmoid air cells and the maxillary sinuses bilaterally.    CERVICAL SPINE CT:   VERTEBRA: There is a slight anterior listhesis of C2 in relation to C3 and C7 in relation to T1. The vertebral body heights are well-maintained throughout. No fracture or posttraumatic subluxation.     CANAL/FORAMINA: There is diffuse degenerative disc disease from the C3-C4 to the C7-T1 disc space level. These levels have a moderate loss of disc space height, endplate changes and small anterior osteophyte formations. Mild diffuse facet arthropathy.    No significant canal compromise or neural foraminal narrowing noted throughout the cervical spine.    PARASPINAL: No extraspinal abnormality. Visualized lung fields are clear.      Impression    IMPRESSION:  HEAD CT:  1.  Multiple small hemorrhagic contusions inferior frontal lobes along with tiny 2 mm subdural hematoma along the anterior inferior falx.  2.  Mild diffuse age related changes.    FACIAL BONE CT:  1.  Multiple nasal bone fractures.  2.  Prominent anterior right frontal scalp hematoma extending to bilateral preseptal soft tissue swelling extending to the soft tissue anterior to the mandible and maxilla.  3.  Prominent dental disease as described above.    CERVICAL SPINE CT:  1.  No fracture or posttraumatic subluxation.  2.  No high-grade spinal canal or neural foraminal stenosis.   Shoulder XR, 2 views, bilateral    Narrative    EXAM: XR SHOULDER BILATERAL G/E 2 VIEWS  LOCATION: [facility]  DATE: 8/4/2023    INDICATION: Bilateral shoulder pain after fall.  COMPARISON: None.       Impression    IMPRESSION:   Right shoulder: Nondisplaced, minimally comminuted acromial fracture. No evidence of additional acute fracture. No dislocation. Soft tissues grossly unremarkable. Mild degenerative changes of the glenohumeral joint. Narrowing of the subacromial space,   likely due to underlying rotator cuff pathology. Calcified right hilar lymph nodes, compatible with old granulomatous disease.    Left shoulder: No acute fracture or dislocation. Mild degenerative changes of the glenohumeral joint. Soft tissues grossly unremarkable. Calcified left hilar lymph nodes, compatible with old granulomatous disease       Medications   iopamidol (ISOVUE-370) solution 83 mL (83 mLs Intravenous $Given 8/4/23 2149)   sodium chloride 0.9 % bag 500mL for CT scan flush use (61 mLs As instructed $Given 8/4/23 2149)   LORazepam (ATIVAN) injection 1 mg (1 mg Intravenous $Given 8/4/23 2236)       Assessments & Plan (with Medical Decision Making)     78-year-old male with a history of alcohol abuse presented after a fall down the stairs in the setting of alcohol intoxication.  His wife heard him fall.  He was brought in by EMS uneventfully.  In the ED his blood pressure was mildly elevated but his vital signs were otherwise reassuring.  He was intoxicated.  His decisional capacity was not intact.  He did not recall the events of the fall.  He did not feel like he needed to be in the emergency department despite obvious evidence of severe injury including ecchymoses covering his face.  He had an ecchymosis on the right chest and shoulder that appeared to be old and said that he had fallen a week ago and [facility].  Imaging today showed a fracture of the acromion on that side.  He complained of severe left shoulder pain but imaging on the of the left shoulder was normal.  CT scan of the brain showed bilateral hemorrhagic contusions of the inferior aspect of the frontal lobes and a small subdural  hematoma.  Maxillofacial CT scanning showed multiple nasal bone fractures.  There was a hematoma on CT imaging and on physical exam but no lacerations requiring repair.  There was incidental finding of multiple dental abscesses.  Blood alcohol level was 0.27.  Sodium was mildly low at 129.  CBC and blood chemistries were otherwise reassuring.  You will require transfer to trauma care and for monitoring.  He was resistant to this and wanting to go home and stating that he did not care if he went home and .  We have placed him on a health officer hold given his chemical dependency and life-threatening injuries and have given him some lorazepam to address his agitation.  Talk to Dr. Estiven Salas in the ED at [facility] who agreed to accept him in transfer.  I apprised the patient and his family of the plan of care.    I have reviewed the nursing notes.    I have reviewed the findings, diagnosis, plan and need for follow up with the patient.         New Prescriptions    No medications on file       Final diagnoses:   Contusion of cerebrum, with unknown loss of consciousness status, unspecified laterality, initial encounter (H)   Subdural hematoma (H)   Closed fracture of nasal bone, initial encounter   Alcohol abuse with intoxication (H)   Fall down stairs, initial encounter   Dental abscess   Closed displaced fracture of acromial process of right scapula, initial encounter       2023   [facility] EMERGENCY DEPT       Moshe Brian MD  [phone]

## 2023-08-05 NOTE — ED PROVIDER NOTES
ED Provider Note  [facility]      History     Chief Complaint   Patient presents with    Fall     HPI  Sunny Inman is a 78 year old male who presents to the emergency department as a transfer from [facility] emergency department where he was seen for evaluation of injury sustained in a fall.  Patient states that he had been drinking alcohol tonight.  He fell down some stairs and was found by his wife.  He was transported to [facility] emergency department where he was found to be intoxicated with alcohol level of 0.27.  CT imaging was performed including a pan scan.  CT is most remarkable for multiple small hemorrhagic contusions inferior frontal lobes along with a 2 mm subdural hematoma along the anterior inferior falx.  Patient also has multiple nasal bone fractures as well as a nondisplaced, and a comminuted right acromial fracture.  His cervical spine CT was negative.  His CT chest, abdomen, pelvis was also negative.  Patient was mildly hyponatremic with a sodium of 129.  He takes a baby aspirin a day.  He denies any diplopia.  He denies any chest pain or dyspnea.  No abdominal pain.    No results found for any visits on 08/05/23.     Past Medical History  Past Medical History:   Diagnosis Date    Chronic obstructive pulmonary disease (H) 5/11/2022    Heart disease     Hypertension     Obstructive sleep apnea syndrome 5/11/2022    Sleep apnea     CPAP     Past Surgical History:   Procedure Laterality Date    ARTHROSCOPY SHOULDER ROTATOR CUFF REPAIR Left 3/27/2018    Procedure: ARTHROSCOPY SHOULDER ROTATOR CUFF REPAIR;  Left Shoulder Arthroscopic Subacromial Decompression, Distal Clavicle Excision;  Surgeon: Alex Pacheco MD;  Location: [facility] OR    PHACOEMULSIFICATION WITH STANDARD INTRAOCULAR LENS IMPLANT  11/15/2012    Procedure: PHACOEMULSIFICATION WITH STANDARD INTRAOCULAR LENS IMPLANT;  Right kelman phacoemulsification with intraocular lens implant;  Surgeon:  Aaron Salas MD;  Location: [facility] OR    PHACOEMULSIFICATION WITH STANDARD INTRAOCULAR LENS IMPLANT  11/29/2012    Procedure: PHACOEMULSIFICATION WITH STANDARD INTRAOCULAR LENS IMPLANT;  Left Kelman phacoemulsification with intraocular lens implant;  Surgeon: Aaron Salas MD;  Location: [facility] OR     albuterol (PROAIR HFA/PROVENTIL HFA/VENTOLIN HFA) 108 (90 Base) MCG/ACT inhaler  ASPIRIN PO  carvedilol (COREG) 25 MG tablet  lisinopril (ZESTRIL) 10 MG tablet  tiotropium (SPIRIVA RESPIMAT) 2.5 MCG/ACT inhaler      Allergies   Allergen Reactions    Nkda [No Known Drug Allergy]      Family History  Family History   Problem Relation Age of Onset    Heart Disease Father     Heart Surgery Father      Social History   Social History     Tobacco Use    Smoking status: Former     Packs/day: 1.00     Years: 40.00     Pack years: 40.00     Types: Cigarettes    Tobacco comments:     Quit 8 years ago   Substance Use Topics    Alcohol use: Yes     Comment: average of 6 beers per day    Drug use: Not Currently         A medically appropriate review of systems was performed with pertinent positives and negatives noted in the HPI, and all other systems negative.    Physical Exam   BP: 128/81  Pulse: 76  Resp: 16  SpO2: 93 %  Physical Exam  Vitals and nursing note reviewed.   Constitutional:       Appearance: Normal appearance.   HENT:      Head:      Comments: Facial contusions with alden-orbital ecchymosis.     Nose: Nasal tenderness present.      Right Nostril: No septal hematoma.      Left Nostril: No septal hematoma.   Eyes:      Extraocular Movements: Extraocular movements intact.      Pupils: Pupils are equal, round, and reactive to light.   Cardiovascular:      Rate and Rhythm: Normal rate and regular rhythm.      Pulses: Normal pulses.   Pulmonary:      Effort: Pulmonary effort is normal.      Breath sounds: Normal breath sounds.   Musculoskeletal:        Arms:       Comments: Ecchymosis right shoulder.  Decreased range  of motion bilateral shoulders.   Skin:     General: Skin is warm and dry.   Neurological:      General: No focal deficit present.      Mental Status: He is alert and oriented to person, place, and time.      Cranial Nerves: No cranial nerve deficit.      Motor: No weakness.      Coordination: Coordination normal.           ED Course, Procedures, & Data      Procedures                  No results found for any visits on 08/05/23.  Medications - No data to display  Labs Ordered and Resulted from Time of ED Arrival to Time of ED Departure - No data to display  CT Head w/o Contrast    (Results Pending)          Critical care was not performed.     Medical Decision Making  The patient's presentation was of moderate complexity (an acute complicated injury).    The patient's evaluation involved:  review of external note(s) from 1 sources (see separate area of note for details)  ordering and/or review of 1 test(s) in this encounter (see separate area of note for details)  review of 3+ test result(s) ordered prior to this encounter (see separate area of note for details)  discussion of management or test interpretation with another health professional (trauma and neurosurgery)    The patient's management necessitated high risk (a decision regarding hospitalization).    Assessment & Plan    78 year old male transferred from [facility] emergency department for evaluation of injury sustained in a fall downstairs while intoxicated.  The patient was found to have frontal cerebral contusion and a small subdural hematoma as well as an acromion fracture and nasal fractures.  The patient does not have evidence for other injury on exam.  He also underwent CT imaging at the referring facility as noted above.  Trauma and neurosurgery have been consulted.  He remained hemodynamically normal.  His neurologic examination is unchanged.  Repeat head CT performed after 6 hours and reveals question development of left temporal contusion.   The patient will be admitted to the trauma service for further evaluation and management.    I have reviewed the nursing notes. I have reviewed the findings, diagnosis, plan and need for follow up with the patient.    New Prescriptions    No medications on file       Final diagnoses:   Subdural hematoma (H)   Contusion of cerebrum without loss of consciousness, unspecified laterality, initial encounter (H)     Chart documentation was completed with Dragon voice-recognition software. Even though reviewed, this chart may still contain some grammatical, spelling, and word errors.   Donald Bowles Md  [facility] EMERGENCY DEPARTMENT  8/5/2023     Donald Bowles MD  08/05/23 [id number]

## 2023-08-05 NOTE — ED TRIAGE NOTES
Patient transferred from other ED for a subdural hematoma. Patient was intoxicated and fell down 9-15 stairs. Patient was non cooperating and outside ER and given Ativan 2245. Patient arrives vitally stable. Patient arrives A&Ox4 but needs to be re-directed and repeats words/sentences.

## 2023-08-05 NOTE — ED NOTES
"Patient educated on sling for arm when OOB. Patient states \"I have a sling at home that has barely been used, I'll use that when I get home. I don't want a new one.\".  "

## 2023-08-05 NOTE — ED NOTES
Nurse administered 1 mg ativan to patient before being transferred out of [facility] ED. Writer and Valeriy from pharmacy wasted 1 mg ativan but was unable to document in pyxis.

## 2023-08-05 NOTE — H&P
[facility]    History and Physical / Consult note: Trauma Service     Date of Admission:  8/5/2023    Time of Admission/Consult Request (page/call): 8/5/2023      Time of my evaluation: 8/5/2023    Consulting services:  Orthopedics - Non-emergent consult: Called by ED  Neurosurgery - Emergent consult (within 30 mins): Called by ED  Oral Maxillofacial - Called by ED    Assessment & Plan     Trauma mechanism: Fall while EtOH intoxicated  Time/date of injury:8/4/2023    Known Injuries:  SDH 2mm along anterior inferior falx  Bilateral small hemorrhagic contusions in frontal lobes  Anterior right frontal scalp hematoma.   Multiple nasal bone fractures.   Right shoulder nondisplaced, minimally comminuted acromial fracture     Other diagnoses:  Alcohol abuse disorder  Hypertension  COPD  DAPHNEY    Plan:  Neurosurgery Consult: pending recommendations  OMFS Consult: pending recommendations  Orthopedic surgery consult: Pending recommendations  Admit to Trauma surgery ICU versus floor  Tertiary trauma exam later today in the AM    Patient was discussed with Caden Chandler MD, Trauma Surgery Attending  Leslie Lopez MD  [facility]  [id number]      Code status: Full code   General Cares:  GI Prophylaxis: PPI  DVT Prophylaxis: SCDs  Date of last stool/Bowel Regimen:  Pulmonary toilet:IS    ETOH: This patient was asked if in the last 3-6 months there has been a time when he had  5 or more drinks in a single day/outing.. Patient answer to the screening question was in the positive. Spoke with the patient about the correlation of ETOH use and accidents/injuries. We also discussed the importance of abstaining from ETOH use while healing from existing injuries, especially if prescribed narcotics at the time of discharge. The patient demonstrated understanding.  Primary Care Physician   [facility]    Chief Complaint   Fall    History is obtained from  the patient, electronic health record, and emergency department physician    History of Present Illness   Sunny Inman is a 78 year old male seen in consultation for Donald Bowles MD from the [facility] emergency department for trauma evaluation.  Patient was transferred from [facility] ER.  Patient has a significant past medical/surgical history including hypertension, hyperlipidemia, coronary artery disease, COPD, obstructive sleep apnea, aortic valve replacement and alcohol abuse disorder.  He takes daily aspirin 81 mg but no other blood thinners.  Patient reported that he had been drinking during the afternoon and around 5 PM, he fell down stairs.  Denies loss of consciousness but refers that he did hit his head and face multiple times.  He was taken to the outside hospital.  His work-up included CT head that showed small subdural hematoma along the anterior inferior falx, multiple bone hemorrhagic contusions; CT cervical spine negative for acute injuries, CT chest/abdomen/pelvis, CT facial bones showed multiple nasal bone fractures, x-ray bilateral shoulders that showed right shoulder nondisplaced, minimally comminuted acromial fracture.  During my consultation, patient is alert and oriented x3, although he is heavily intoxicated, he is protecting his airway, hemodynamically stable, moving 4 extremities and denies any pains at this time.  Patient lives with his wife in [place], [place]. He refers he drinks heavily (12 pack/day) at least once a week.      Past Medical History    I have reviewed this patient's medical history and updated it with pertinent information if needed.   Past Medical History:   Diagnosis Date    Chronic obstructive pulmonary disease (H) 5/11/2022    Heart disease     Hypertension     Obstructive sleep apnea syndrome 5/11/2022    Sleep apnea     CPAP       Past Surgical History   I have reviewed this patient's surgical history and updated it  with pertinent information if needed.  Past Surgical History:   Procedure Laterality Date    ARTHROSCOPY SHOULDER ROTATOR CUFF REPAIR Left 3/27/2018    Procedure: ARTHROSCOPY SHOULDER ROTATOR CUFF REPAIR;  Left Shoulder Arthroscopic Subacromial Decompression, Distal Clavicle Excision;  Surgeon: Alex Pacheco MD;  Location: [facility] OR    PHACOEMULSIFICATION WITH STANDARD INTRAOCULAR LENS IMPLANT  11/15/2012    Procedure: PHACOEMULSIFICATION WITH STANDARD INTRAOCULAR LENS IMPLANT;  Right kelman phacoemulsification with intraocular lens implant;  Surgeon: Aaron Salas MD;  Location: [facility] OR    PHACOEMULSIFICATION WITH STANDARD INTRAOCULAR LENS IMPLANT  11/29/2012    Procedure: PHACOEMULSIFICATION WITH STANDARD INTRAOCULAR LENS IMPLANT;  Left Kelman phacoemulsification with intraocular lens implant;  Surgeon: Aaron Salas MD;  Location: [facility] OR     Prior to Admission Medications   Prior to Admission Medications   Prescriptions Last Dose Informant Patient Reported? Taking?   ASPIRIN PO  Self Yes No   Sig: Take 81 mg by mouth   albuterol (PROAIR HFA/PROVENTIL HFA/VENTOLIN HFA) 108 (90 Base) MCG/ACT inhaler  Self Yes No   carvedilol (COREG) 25 MG tablet  Self Yes No   Sig: Take 12.5 mg by mouth 2 times daily (with meals)   lisinopril (ZESTRIL) 10 MG tablet  Self Yes No   Sig: Take 0.5 tablets by mouth daily   tiotropium (SPIRIVA RESPIMAT) 2.5 MCG/ACT inhaler  Self Yes No   Sig: Inhale 2 puffs into the lungs daily      Facility-Administered Medications: None     Allergies   Allergies   Allergen Reactions    Nkda [No Known Drug Allergy]        Social History   Social History     Socioeconomic History    Marital status:      Spouse name: Not on file    Number of children: Not on file    Years of education: Not on file    Highest education level: Not on file   Occupational History    Not on file   Tobacco Use    Smoking status: Former     Packs/day: 1.00     Years: 40.00     Pack years: 40.00     Types:  Cigarettes    Smokeless tobacco: Not on file    Tobacco comments:     Quit 8 years ago   Substance and Sexual Activity    Alcohol use: Yes     Comment: average of 6 beers per day    Drug use: Not Currently    Sexual activity: Not on file   Other Topics Concern    Not on file   Social History Narrative    Not on file     Social Determinants of Health     Financial Resource Strain: Not on file   Food Insecurity: Not on file   Transportation Needs: Not on file   Physical Activity: Not on file   Stress: Not on file   Social Connections: Not on file   Intimate Partner Violence: Not on file   Housing Stability: Not on file       Family History   I have reviewed this patient's family history and updated it with pertinent information if needed.   Family History   Problem Relation Age of Onset    Heart Disease Father     Heart Surgery Father        Review of Systems   CONSTITUTIONAL: No fever, chills, sweats, fatigue   EYES: bilateral eyelids ecchymoses, no visual blurring, no double vision or visual loss  ENT: no decrease in hearing, no tinnitus, no vertigo, no hoarseness  RESPIRATORY: no shortness of breath, no cough, no sputum   CARDIOVASCULAR: no palpitations, no chest  pain, no exertional chest pain or pressure  GASTROINTESTINAL: no nausea or vomiting, or abd pain  GENITOURINARY: no dysuria, no frequency or hesitancy, no hematuria  MUSCULOSKELETAL: no weakness, no redness, no swelling, no joint pain,   SKIN: no rashes, ecchymoses, abrasions or lacerations  NEUROLOGIC: no numbness or tingling of hands, no numbness or tingling  of feet, no syncope, no tremors or weakness  PSYCHIATRIC: no sleep disturbances, no anxiety or depression    Physical Exam       BP: 128/81 Pulse: 76   Resp: 16 SpO2: 93 % O2 Device: None (Room air)    Vital Signs with Ranges  Temp:  [98  F (36.7  C)] 98  F (36.7  C)  Pulse:  [63-84] 76  Resp:  [16-18] 16  BP: ()/() 128/81  SpO2:  [93 %-95 %] 93 % 0 lbs 0 oz    Primary Survey:  Airway:  patient talking  Breathing: symmetric respiratory effort bilaterally  Circulation: central pulses present and peripheral pulses present  Disability: Pupils - left 3 mm and brisk, right 3 mm and brisk   [place] Coma Scale - Total 15/15  Eye Response (E): 43  4= spontaneous,  3= to verbal/voice, 2=  to pain, 1= No response   Verbal Response (V): 5   5= Orientated, converses,  4= Confused, converses, 3= Inappropriate words,  2= Incomprehensible sounds,  1=No response   Motor Response (M): 6   6= Obeys commands, 5= Localizes to pain, 4= Withdrawal to pain, 3=Fexion to pain, 2= Extension to pain, 1= No response    Secondary Survey:  General: alert, oriented to person, place, time  Neuro: PERRLA. EOMI. CN II-XII grossly intact. No focal deficits. Strength 5/5 x 4 extremities.  Sensation intact.  Head: Forehead hematoma, otherwise normocephalic, trachea midline  Eyes:  bilateral eyelids ecchymoses, Pupils 3 mm, EOMI, corneas and conjunctivae clear  Ears: pearly grey bilateral TMs and non-inflamed external ear canals  Nose: nares patent, no drainage, nasal septum non-tender  Mouth/Throat: no exudates or erythema,  no dental tenderness or malocclusions, no tongue lacerations  Neck: no cervical collar present. No midline posterior tenderness, full AROM without pain.   Chest/Pulmonary: normal respiratory rate and rhythm,  bilateral clear breath sounds, no wheezes, rales or rhonchi, no chest wall tenderness or deformities,   Cardiovascular: S1, S2,  normal and regular rate and rhythm, no murmurs  Abdomen: soft, non-tender, no guarding, no rebound tenderness and no tenderness to palpation  : normal external genitalia, pelvis stable to lateral compression, no renteria, no urine assess/urine yellow and clear  Musculoskel/Extremities: normal extremities, full AROM of major joints without tenderness, edema, erythema, ecchymosis, or abrasions. PP. No edema.   Back/Spine: no deformity, no midline tenderness, no sacral tenderness, no  step-offs and no abrasions or contusions  Hands: no gross deformities of hands or fingers. Full AROM of hand and fingers in flexion and extension.  strength equal and symmetric.   Psychiatric: affect/mood normal, cooperative, normal judgement/insight and memory intact  Skin: bilateral eyelids ecchymoses, otherwise no rashes, laceration, ecchymosis, skin warm and dry.     Results for orders placed or performed during the hospital encounter of 08/04/23 (from the past 24 hour(s))   [facility] Draw    Narrative    The following orders were created for panel order [facility] Draw.  Procedure                               Abnormality         Status                     ---------                               -----------         ------                     Extra Blue Top Tube[[id number]]                              Final result               Extra Red Top Tube[[id number]]                               Final result               Extra Green Top (Lithium...[[id number]]                      Final result               Extra Purple Top Tube[[id number]]                            Final result                 Please view results for these tests on the individual orders.   Extra Blue Top Tube   Result Value Ref Range    Hold Specimen JIC    Extra Red Top Tube   Result Value Ref Range    Hold Specimen JIC    Extra Green Top (Lithium Heparin) Tube   Result Value Ref Range    Hold Specimen JIC    Extra Purple Top Tube   Result Value Ref Range    Hold Specimen JIC    CBC with platelets differential    Narrative    The following orders were created for panel order CBC with platelets differential.  Procedure                               Abnormality         Status                     ---------                               -----------         ------                     CBC with platelets and d...[[id number]]  Abnormal            Final result                 Please view results for these tests on the individual orders.   Comprehensive metabolic  panel   Result Value Ref Range    Sodium 129 (L) 136 - 145 mmol/L    Potassium 4.0 3.4 - 5.3 mmol/L    Chloride 94 (L) 98 - 107 mmol/L    Carbon Dioxide (CO2) 21 (L) 22 - 29 mmol/L    Anion Gap 14 7 - 15 mmol/L    Urea Nitrogen 10.7 8.0 - 23.0 mg/dL    Creatinine 0.71 0.67 - 1.17 mg/dL    Calcium 8.4 (L) 8.8 - 10.2 mg/dL    Glucose 97 70 - 99 mg/dL    Alkaline Phosphatase 65 40 - 129 U/L    AST 26 0 - 45 U/L    ALT 17 0 - 70 U/L    Protein Total 6.5 6.4 - 8.3 g/dL    Albumin 4.0 3.5 - 5.2 g/dL    Bilirubin Total 0.4 <=1.2 mg/dL    GFR Estimate >90 >60 mL/min/1.73m2   Ethyl Alcohol Level   Result Value Ref Range    Alcohol ethyl 0.27 (H) <=0.01 g/dL   CBC with platelets and differential   Result Value Ref Range    WBC Count 5.3 4.0 - 11.0 10e3/uL    RBC Count 3.99 (L) 4.40 - 5.90 10e6/uL    Hemoglobin 13.6 13.3 - 17.7 g/dL    Hematocrit 39.5 (L) 40.0 - 53.0 %    MCV 99 78 - 100 fL    MCH 34.1 (H) 26.5 - 33.0 pg    MCHC 34.4 31.5 - 36.5 g/dL    RDW 12.3 10.0 - 15.0 %    Platelet Count 132 (L) 150 - 450 10e3/uL    % Neutrophils 41 %    % Lymphocytes 42 %    % Monocytes 14 %    % Eosinophils 2 %    % Basophils 1 %    % Immature Granulocytes 0 %    NRBCs per 100 WBC 0 <1 /100    Absolute Neutrophils 2.2 1.6 - 8.3 10e3/uL    Absolute Lymphocytes 2.3 0.8 - 5.3 10e3/uL    Absolute Monocytes 0.7 0.0 - 1.3 10e3/uL    Absolute Eosinophils 0.1 0.0 - 0.7 10e3/uL    Absolute Basophils 0.0 0.0 - 0.2 10e3/uL    Absolute Immature Granulocytes 0.0 <=0.4 10e3/uL    Absolute NRBCs 0.0 10e3/uL   INR   Result Value Ref Range    INR 1.02 0.85 - 1.15   CT Cervical Spine w/o Contrast    Narrative    EXAM: CT FACIAL BONES WITHOUT CONTRAST, CT HEAD W/O CONTRAST, CT CERVICAL SPINE W/O CONTRAST  LOCATION: [facility]  DATE: 8/4/2023    INDICATION: fall down stairs; facial injury altered mental status and neck pain and facial pain.  COMPARISON: None.  TECHNIQUE:   1) Routine CT Head without IV contrast. Multiplanar  reformats. Dose reduction techniques were used.  2) Routine CT Facial Bones without IV contrast. Multiplanar reformats. Dose reduction techniques were used.  3) Routine CT Cervical Spine without IV contrast. Multiplanar reformats. Dose reduction techniques were used.    FINDINGS:  HEAD CT:   INTRACRANIAL CONTENTS: Small hemorrhagic contusions involving the inferior frontal lobes bilaterally and a probable tiny subdural hematoma of about 2 mm along the anterior inferior falx. There is no evidence of a midline shift. No CT evidence of acute   infarct. There is scattered diffuse low attenuation within the periventricular and subcortical white matter consistent with diffuse small vessel ischemic disease. The ventricular system, basal cisterns and the cortical sulci are consistent with mild   diffuse volume loss.     OSSEOUS STRUCTURES/SOFT TISSUES: Calvarium is intact. There is a prominent anterior right frontal scalp hematoma.     FACIAL BONE CT:  OSSEOUS STRUCTURES/SOFT TISSUES: There is soft tissue swelling anterior to the mandible extending up to the maxilla along with the right frontal scalp hematoma extending to the nasal bridge and the bilateral preseptal soft tissues. The mandible is   intact. The temporomandibular joints have good anatomic alignment. No other discrete facial fracture is visualized. There are periapical lucencies involving the posterior mandibular molars. There are also periapical lucencies premolars of the mandible   bilaterally and the teeth are both cracked. Of note there is a dentigerous cyst associated with the left maxillary second premolar. Recommend clinical correlation for dental disease.    ORBITAL CONTENTS: Besides the preseptal soft tissue swelling the orbit regions are unremarkable.    SINUSES: Mild mucosal thickening ethmoid air cells and the maxillary sinuses bilaterally.    CERVICAL SPINE CT:   VERTEBRA: There is a slight anterior listhesis of C2 in relation to C3 and C7 in  relation to T1. The vertebral body heights are well-maintained throughout. No fracture or posttraumatic subluxation.     CANAL/FORAMINA: There is diffuse degenerative disc disease from the C3-C4 to the C7-T1 disc space level. These levels have a moderate loss of disc space height, endplate changes and small anterior osteophyte formations. Mild diffuse facet arthropathy.    No significant canal compromise or neural foraminal narrowing noted throughout the cervical spine.    PARASPINAL: No extraspinal abnormality. Visualized lung fields are clear.      Impression    IMPRESSION:  HEAD CT:  1.  Multiple small hemorrhagic contusions inferior frontal lobes along with tiny 2 mm subdural hematoma along the anterior inferior falx.  2.  Mild diffuse age related changes.    FACIAL BONE CT:  1.  Multiple nasal bone fractures.  2.  Prominent anterior right frontal scalp hematoma extending to bilateral preseptal soft tissue swelling extending to the soft tissue anterior to the mandible and maxilla.  3.  Prominent dental disease as described above.    CERVICAL SPINE CT:  1.  No fracture or posttraumatic subluxation.  2.  No high-grade spinal canal or neural foraminal stenosis.   CT Facial Bones without Contrast    Narrative    EXAM: CT FACIAL BONES WITHOUT CONTRAST, CT HEAD W/O CONTRAST, CT CERVICAL SPINE W/O CONTRAST  LOCATION: [facility]  DATE: 8/4/2023    INDICATION: fall down stairs; facial injury altered mental status and neck pain and facial pain.  COMPARISON: None.  TECHNIQUE:   1) Routine CT Head without IV contrast. Multiplanar reformats. Dose reduction techniques were used.  2) Routine CT Facial Bones without IV contrast. Multiplanar reformats. Dose reduction techniques were used.  3) Routine CT Cervical Spine without IV contrast. Multiplanar reformats. Dose reduction techniques were used.    FINDINGS:  HEAD CT:   INTRACRANIAL CONTENTS: Small hemorrhagic contusions involving the inferior  frontal lobes bilaterally and a probable tiny subdural hematoma of about 2 mm along the anterior inferior falx. There is no evidence of a midline shift. No CT evidence of acute   infarct. There is scattered diffuse low attenuation within the periventricular and subcortical white matter consistent with diffuse small vessel ischemic disease. The ventricular system, basal cisterns and the cortical sulci are consistent with mild   diffuse volume loss.     OSSEOUS STRUCTURES/SOFT TISSUES: Calvarium is intact. There is a prominent anterior right frontal scalp hematoma.     FACIAL BONE CT:  OSSEOUS STRUCTURES/SOFT TISSUES: There is soft tissue swelling anterior to the mandible extending up to the maxilla along with the right frontal scalp hematoma extending to the nasal bridge and the bilateral preseptal soft tissues. The mandible is   intact. The temporomandibular joints have good anatomic alignment. No other discrete facial fracture is visualized. There are periapical lucencies involving the posterior mandibular molars. There are also periapical lucencies premolars of the mandible   bilaterally and the teeth are both cracked. Of note there is a dentigerous cyst associated with the left maxillary second premolar. Recommend clinical correlation for dental disease.    ORBITAL CONTENTS: Besides the preseptal soft tissue swelling the orbit regions are unremarkable.    SINUSES: Mild mucosal thickening ethmoid air cells and the maxillary sinuses bilaterally.    CERVICAL SPINE CT:   VERTEBRA: There is a slight anterior listhesis of C2 in relation to C3 and C7 in relation to T1. The vertebral body heights are well-maintained throughout. No fracture or posttraumatic subluxation.     CANAL/FORAMINA: There is diffuse degenerative disc disease from the C3-C4 to the C7-T1 disc space level. These levels have a moderate loss of disc space height, endplate changes and small anterior osteophyte formations. Mild diffuse facet  arthropathy.    No significant canal compromise or neural foraminal narrowing noted throughout the cervical spine.    PARASPINAL: No extraspinal abnormality. Visualized lung fields are clear.      Impression    IMPRESSION:  HEAD CT:  1.  Multiple small hemorrhagic contusions inferior frontal lobes along with tiny 2 mm subdural hematoma along the anterior inferior falx.  2.  Mild diffuse age related changes.    FACIAL BONE CT:  1.  Multiple nasal bone fractures.  2.  Prominent anterior right frontal scalp hematoma extending to bilateral preseptal soft tissue swelling extending to the soft tissue anterior to the mandible and maxilla.  3.  Prominent dental disease as described above.    CERVICAL SPINE CT:  1.  No fracture or posttraumatic subluxation.  2.  No high-grade spinal canal or neural foraminal stenosis.   CT Chest/Abdomen/Pelvis w Contrast    Narrative    EXAM: CT CHEST/ABDOMEN/PELVIS W CONTRAST  LOCATION: [facility]  DATE: 8/4/2023    INDICATION: intoxicated; fell down stairs.  COMPARISON: None.  TECHNIQUE: CT scan of the chest, abdomen, and pelvis was performed following injection of IV contrast. Multiplanar reformats were obtained. Dose reduction techniques were used.   CONTRAST: 83mL of Isovue 370    FINDINGS:   LUNGS AND PLEURA: Moderate centrilobular emphysema. Calcified granulomas.    MEDIASTINUM/AXILLAE: 4.6 cm dilatation ascending thoracic aorta. Heart size normal. Aortic valve prosthesis. Calcified mediastinal and hilar lymph nodes.    CORONARY ARTERY CALCIFICATION: Mild.    HEPATOBILIARY: Normal.    PANCREAS: Normal.    SPLEEN: Granulomatous calcifications.    ADRENAL GLANDS: Normal.    KIDNEYS/BLADDER: Bladder distention.    BOWEL: Normal. No free air or free fluid.    LYMPH NODES: Normal.    VASCULATURE: Atherosclerosis. Ectasia abdominal aorta.    PELVIC ORGANS: Normal.    MUSCULOSKELETAL: Sternotomy.      Impression    IMPRESSION:  1.  No evidence of acute traumatic  injury.   CT Head w/o Contrast    Narrative    EXAM: CT FACIAL BONES WITHOUT CONTRAST, CT HEAD W/O CONTRAST, CT CERVICAL SPINE W/O CONTRAST  LOCATION: [facility]  DATE: 8/4/2023    INDICATION: fall down stairs; facial injury altered mental status and neck pain and facial pain.  COMPARISON: None.  TECHNIQUE:   1) Routine CT Head without IV contrast. Multiplanar reformats. Dose reduction techniques were used.  2) Routine CT Facial Bones without IV contrast. Multiplanar reformats. Dose reduction techniques were used.  3) Routine CT Cervical Spine without IV contrast. Multiplanar reformats. Dose reduction techniques were used.    FINDINGS:  HEAD CT:   INTRACRANIAL CONTENTS: Small hemorrhagic contusions involving the inferior frontal lobes bilaterally and a probable tiny subdural hematoma of about 2 mm along the anterior inferior falx. There is no evidence of a midline shift. No CT evidence of acute   infarct. There is scattered diffuse low attenuation within the periventricular and subcortical white matter consistent with diffuse small vessel ischemic disease. The ventricular system, basal cisterns and the cortical sulci are consistent with mild   diffuse volume loss.     OSSEOUS STRUCTURES/SOFT TISSUES: Calvarium is intact. There is a prominent anterior right frontal scalp hematoma.     FACIAL BONE CT:  OSSEOUS STRUCTURES/SOFT TISSUES: There is soft tissue swelling anterior to the mandible extending up to the maxilla along with the right frontal scalp hematoma extending to the nasal bridge and the bilateral preseptal soft tissues. The mandible is   intact. The temporomandibular joints have good anatomic alignment. No other discrete facial fracture is visualized. There are periapical lucencies involving the posterior mandibular molars. There are also periapical lucencies premolars of the mandible   bilaterally and the teeth are both cracked. Of note there is a dentigerous cyst associated with  the left maxillary second premolar. Recommend clinical correlation for dental disease.    ORBITAL CONTENTS: Besides the preseptal soft tissue swelling the orbit regions are unremarkable.    SINUSES: Mild mucosal thickening ethmoid air cells and the maxillary sinuses bilaterally.    CERVICAL SPINE CT:   VERTEBRA: There is a slight anterior listhesis of C2 in relation to C3 and C7 in relation to T1. The vertebral body heights are well-maintained throughout. No fracture or posttraumatic subluxation.     CANAL/FORAMINA: There is diffuse degenerative disc disease from the C3-C4 to the C7-T1 disc space level. These levels have a moderate loss of disc space height, endplate changes and small anterior osteophyte formations. Mild diffuse facet arthropathy.    No significant canal compromise or neural foraminal narrowing noted throughout the cervical spine.    PARASPINAL: No extraspinal abnormality. Visualized lung fields are clear.      Impression    IMPRESSION:  HEAD CT:  1.  Multiple small hemorrhagic contusions inferior frontal lobes along with tiny 2 mm subdural hematoma along the anterior inferior falx.  2.  Mild diffuse age related changes.    FACIAL BONE CT:  1.  Multiple nasal bone fractures.  2.  Prominent anterior right frontal scalp hematoma extending to bilateral preseptal soft tissue swelling extending to the soft tissue anterior to the mandible and maxilla.  3.  Prominent dental disease as described above.    CERVICAL SPINE CT:  1.  No fracture or posttraumatic subluxation.  2.  No high-grade spinal canal or neural foraminal stenosis.   Shoulder XR, 2 views, bilateral    Narrative    EXAM: XR SHOULDER BILATERAL G/E 2 VIEWS  LOCATION: [facility]  DATE: 8/4/2023    INDICATION: Bilateral shoulder pain after fall.  COMPARISON: None.      Impression    IMPRESSION:   Right shoulder: Nondisplaced, minimally comminuted acromial fracture. No evidence of additional acute fracture. No  dislocation. Soft tissues grossly unremarkable. Mild degenerative changes of the glenohumeral joint. Narrowing of the subacromial space,   likely due to underlying rotator cuff pathology. Calcified right hilar lymph nodes, compatible with old granulomatous disease.    Left shoulder: No acute fracture or dislocation. Mild degenerative changes of the glenohumeral joint. Soft tissues grossly unremarkable. Calcified left hilar lymph nodes, compatible with old granulomatous disease       Studies:  No orders to display       Leslie Lopez MD

## 2023-08-06 ENCOUNTER — APPOINTMENT (OUTPATIENT)
Dept: OCCUPATIONAL THERAPY | Facility: CLINIC | Age: 78
DRG: 083 | End: 2023-08-06
Attending: SURGERY
Payer: COMMERCIAL

## 2023-08-06 PROBLEM — F10.929 ALCOHOLIC INTOXICATION WITH COMPLICATION (H): Status: ACTIVE | Noted: 2023-08-06

## 2023-08-06 PROBLEM — S06.330A: Status: ACTIVE | Noted: 2023-08-06

## 2023-08-06 LAB
SODIUM SERPL-SCNC: 131 MMOL/L (ref 136–145)
SODIUM SERPL-SCNC: 134 MMOL/L (ref 136–145)
SODIUM SERPL-SCNC: 134 MMOL/L (ref 136–145)

## 2023-08-06 PROCEDURE — 97165 OT EVAL LOW COMPLEX 30 MIN: CPT | Mod: GO | Performed by: OCCUPATIONAL THERAPIST

## 2023-08-06 PROCEDURE — 250N000013 HC RX MED GY IP 250 OP 250 PS 637: Performed by: PHYSICIAN ASSISTANT

## 2023-08-06 PROCEDURE — 96366 THER/PROPH/DIAG IV INF ADDON: CPT

## 2023-08-06 PROCEDURE — 93005 ELECTROCARDIOGRAM TRACING: CPT

## 2023-08-06 PROCEDURE — 250N000013 HC RX MED GY IP 250 OP 250 PS 637: Performed by: SURGERY

## 2023-08-06 PROCEDURE — 96361 HYDRATE IV INFUSION ADD-ON: CPT

## 2023-08-06 PROCEDURE — 84295 ASSAY OF SERUM SODIUM: CPT | Performed by: PHYSICIAN ASSISTANT

## 2023-08-06 PROCEDURE — 258N000003 HC RX IP 258 OP 636: Performed by: PHYSICIAN ASSISTANT

## 2023-08-06 PROCEDURE — 250N000011 HC RX IP 250 OP 636: Performed by: PHYSICIAN ASSISTANT

## 2023-08-06 PROCEDURE — 36415 COLL VENOUS BLD VENIPUNCTURE: CPT | Performed by: PHYSICIAN ASSISTANT

## 2023-08-06 PROCEDURE — 96376 TX/PRO/DX INJ SAME DRUG ADON: CPT

## 2023-08-06 PROCEDURE — 93010 ELECTROCARDIOGRAM REPORT: CPT | Performed by: INTERNAL MEDICINE

## 2023-08-06 PROCEDURE — 99232 SBSQ HOSP IP/OBS MODERATE 35: CPT | Performed by: PHYSICIAN ASSISTANT

## 2023-08-06 PROCEDURE — G0378 HOSPITAL OBSERVATION PER HR: HCPCS

## 2023-08-06 PROCEDURE — 999N000147 HC STATISTIC PT IP EVAL DEFER

## 2023-08-06 PROCEDURE — 258N000003 HC RX IP 258 OP 636: Performed by: SURGERY

## 2023-08-06 PROCEDURE — 250N000011 HC RX IP 250 OP 636: Performed by: SURGERY

## 2023-08-06 PROCEDURE — 97535 SELF CARE MNGMENT TRAINING: CPT | Mod: GO | Performed by: OCCUPATIONAL THERAPIST

## 2023-08-06 PROCEDURE — 250N000009 HC RX 250: Performed by: SURGERY

## 2023-08-06 PROCEDURE — 120N000003 HC R&B IMCU UMMC

## 2023-08-06 RX ORDER — GABAPENTIN 300 MG/1
600 CAPSULE ORAL EVERY 8 HOURS
Status: CANCELLED | OUTPATIENT
Start: 2023-08-08

## 2023-08-06 RX ORDER — POLYETHYLENE GLYCOL 3350 17 G/17G
17 POWDER, FOR SOLUTION ORAL DAILY
Status: DISCONTINUED | OUTPATIENT
Start: 2023-08-06 | End: 2023-08-07 | Stop reason: HOSPADM

## 2023-08-06 RX ADMIN — CARVEDILOL 12.5 MG: 12.5 TABLET, FILM COATED ORAL at 07:39

## 2023-08-06 RX ADMIN — FOLIC ACID 1 MG: 1 TABLET ORAL at 07:39

## 2023-08-06 RX ADMIN — FOLIC ACID: 5 INJECTION, SOLUTION INTRAMUSCULAR; INTRAVENOUS; SUBCUTANEOUS at 00:08

## 2023-08-06 RX ADMIN — GABAPENTIN 900 MG: 300 CAPSULE ORAL at 22:04

## 2023-08-06 RX ADMIN — ACETAMINOPHEN 975 MG: 325 TABLET, FILM COATED ORAL at 20:30

## 2023-08-06 RX ADMIN — ACETAMINOPHEN 975 MG: 325 TABLET, FILM COATED ORAL at 05:01

## 2023-08-06 RX ADMIN — THIAMINE HYDROCHLORIDE 500 MG: 100 INJECTION, SOLUTION INTRAMUSCULAR; INTRAVENOUS at 13:45

## 2023-08-06 RX ADMIN — GABAPENTIN 900 MG: 300 CAPSULE ORAL at 06:44

## 2023-08-06 RX ADMIN — THIAMINE HYDROCHLORIDE 500 MG: 100 INJECTION, SOLUTION INTRAMUSCULAR; INTRAVENOUS at 20:27

## 2023-08-06 RX ADMIN — SODIUM CHLORIDE: 9 INJECTION, SOLUTION INTRAVENOUS at 09:22

## 2023-08-06 RX ADMIN — CLONIDINE HYDROCHLORIDE 0.1 MG: 0.1 TABLET ORAL at 13:36

## 2023-08-06 RX ADMIN — LISINOPRIL 5 MG: 2.5 TABLET ORAL at 07:39

## 2023-08-06 RX ADMIN — Medication 1 TABLET: at 07:39

## 2023-08-06 RX ADMIN — CARVEDILOL 12.5 MG: 12.5 TABLET, FILM COATED ORAL at 18:14

## 2023-08-06 RX ADMIN — GABAPENTIN 900 MG: 300 CAPSULE ORAL at 13:36

## 2023-08-06 RX ADMIN — CLONIDINE HYDROCHLORIDE 0.1 MG: 0.1 TABLET ORAL at 22:04

## 2023-08-06 RX ADMIN — UMECLIDINIUM 1 PUFF: 62.5 AEROSOL, POWDER ORAL at 07:42

## 2023-08-06 RX ADMIN — ALBUTEROL SULFATE 2 PUFF: 90 AEROSOL, METERED RESPIRATORY (INHALATION) at 23:17

## 2023-08-06 RX ADMIN — POLYETHYLENE GLYCOL 3350 17 G: 17 POWDER, FOR SOLUTION ORAL at 15:29

## 2023-08-06 RX ADMIN — ACETAMINOPHEN 975 MG: 325 TABLET, FILM COATED ORAL at 13:36

## 2023-08-06 RX ADMIN — THIAMINE HYDROCHLORIDE 500 MG: 100 INJECTION, SOLUTION INTRAMUSCULAR; INTRAVENOUS at 07:35

## 2023-08-06 RX ADMIN — CLONIDINE HYDROCHLORIDE 0.1 MG: 0.1 TABLET ORAL at 06:44

## 2023-08-06 ASSESSMENT — ACTIVITIES OF DAILY LIVING (ADL)
ADLS_ACUITY_SCORE: 24
ADLS_ACUITY_SCORE: 24
IADL_COMMENTS: SPOUSE CAN ASSIST
ADLS_ACUITY_SCORE: 24

## 2023-08-06 NOTE — PLAN OF CARE
"Admission          8/5/2023 12:29 AM  -----------------------------------------------------------  Reason for admission: fell down stairs, wife found  Primary team notified of pt arrival.  Admitted from: [facility]  Via: stretcher  Accompanied by: wife and daughter came to bedside shortly after pt admitted   Belongings: remain at bedside  Admission Profile: complete  Teaching: orientation to unit and call light- call light within reach, call don't fall, use of console, meal times, when to call for the RN, and enforced importance of safety   Access: PIV  Telemetry:Placed on pt  Ht./Wt.: complete  Code Status verified on armband: yes  2 RN Skin Assessment Completed By: Gonzalez RN  Med Rec completed: yes  Suction/Ambu bag/Flowmeter at bedside: yes  Is patient having diarrhea upon admission- if YES fill out testing algorithm : no    C. Diff Testing Algorithm (MUST be marked YES)   3 or more loose stools in 24 hrs. [] Yes [x] No       Additional symptoms:(At least ONE must be marked yes)   Abdominal pain/discomfort [] Yes [x] No   Fever at least 38C (100.4 F) [] Yes [x] No   Elevated WBC(>11,000) [] Yes [x] No       Exclusion Criteria:  (MUST be marked YES)   Off laxatives for at least 48 hrs. [x] Yes [] No       Pt status: Pt admitted to the floor at 1830 from [facility], pt fell down stairs, does not remember incident, was found by wife.  Pts has bruised face and right shoulder, sling ordered and placed on R) arm.  Pt instructed to follow nasal precautions.  Pt has scattered bruises and abrasions t/o.  Pt oriented to room and call light system, instructed to use call light, bedalarm turned on.  Pt observation status, pt given \"what is observation?\" Form, explained to pt, wife and daughter.      Temp:  [97.4  F (36.3  C)-98.4  F (36.9  C)] 97.4  F (36.3  C)  Pulse:  [58-85] 58  Resp:  [11-24] 20  BP: ()/() 121/69  FiO2 (%):  [28 %] 28 %  SpO2:  [93 %-98 %] 98 %   "

## 2023-08-06 NOTE — PROGRESS NOTES
Physical Therapy: Orders received. Chart reviewed and discussed with care team.? Physical Therapy not indicated due to pt mobilizing independently per OT assessment.? Defer discharge recommendations to OT, please see their eval.? Will complete orders.

## 2023-08-06 NOTE — PROGRESS NOTES
"[facility]   08/06/2023  Neurosurgery Progress Note:    Assessment:  78 year old male on daily 81mg aspirin presenting with nasal bone fractures, acromial fracture, small hemorrhagic bifrontal contusions, and questionable subdural hematoma after an intoxicated fall.  Repeat head CTs have been stable and he is neurologically intact.      Plan:  - Repeat head CT 8/7 AM  - Hold off on aspirin for at least 10 days   - Follow-up with neurosurgery outpatient with a repeat head CT   - Neurosurgery will continue to follow     Carmen Garrett MD  PGY-1   Neurosurgery     Interval History:   Sunny Inman is a 78 year old male with a past medical history of COPD, HTN, HLD, DAPHNEY, coronary artery bypass, and bioprosthetic aortic valve replacement on daily 81 mg aspirin who was seen for IPH/SDH.  The patient reports that earlier today while he was at home he \"had a bit too much to drink\" and fell down a set of stairs.  This occurred around 5 PM.  No loss of consciousness but he did hit his head and face multiple times along the way.  He initially presented to [facility], and there was found to have multiple small hemorrhagic contusions in the inferior frontal lobes as well as a probable 2 mm subdural hematoma along the anterior inferior falx.  He was additionally found to have multiple nasal bone fractures and a nondisplaced, comminuted right acromial fracture.  CT C-spine negative.  CT CAP negative. He was transferred to [facility] for higher level of care given intracranial hemorrhage.     Objective:   Temp:  [97.4  F (36.3  C)-98.3  F (36.8  C)] 98.3  F (36.8  C)  Pulse:  [54-66] 64  Resp:  [10-20] 16  BP: ()/(56-73) 108/70  Cuff Mean (mmHg):  [46-70] 46  SpO2:  [90 %-100 %] 93 %  I/O last 3 completed shifts:  In: 1901.25 [P.O.:960; I.V.:941.25]  Out: 2475 [Urine:2475]    Gen: Appears comfortable, NAD  Neurologic:  - Alert & Oriented to person, place, time, and situation  - Follows " commands briskly  - Speech fluent, spontaneous. No aphasia or dysarthria.  - No gaze preference. No apparent hemineglect.  - PERRL, EOMI  - Face symmetric with sensation intact to light touch  - Palate elevates symmetrically, uvula midline, tongue protrudes midline  - Trapezii muscles 5/5 bilaterally  - No pronator drift     Del Tr Bi WE WF Gr   R 5 5 5 5 5 5   L 5 5 5 5 5 5    HF KE KF DF PF EHL   R 5 5 5 5 5 5   L 5 5 5 5 5 5     Reflexes 2+ throughout    Sensation intact and symmetric to light touch throughout    LABS:  Recent Labs   Lab 08/06/23  1213 08/06/23  0536 08/06/23  0219 08/05/23  1133 08/04/23  2042   * 134* 131*   < > 129*   POTASSIUM  --   --   --   --  4.0   CHLORIDE  --   --   --   --  94*   CO2  --   --   --   --  21*   ANIONGAP  --   --   --   --  14   GLC  --   --   --   --  97   BUN  --   --   --   --  10.7   CR  --   --   --   --  0.71   ERICA  --   --   --   --  8.4*    < > = values in this interval not displayed.     Recent Labs   Lab 08/05/23  0650   WBC 4.9   RBC 3.78*   HGB 12.6*   HCT 37.0*   MCV 98   MCH 33.3*   MCHC 34.1   RDW 12.4   *     IMAGING:  No results found for this or any previous visit (from the past 24 hour(s)).

## 2023-08-06 NOTE — PROGRESS NOTES
08/06/23 1400   Appointment Info   Signing Clinician's Name / Credentials (OT) Blas Lemon OTR/L   Rehab Comments (OT) sinus precautions   Living Environment   People in Home spouse   Current Living Arrangements house   Home Accessibility stairs within home   Number of Stairs, Within Home, Primary greater than 10 stairs   Stair Railings, Within Home, Primary railing on right side (ascending)   Transportation Anticipated family or friend will provide   Living Environment Comments Pt reports living with wife, stairs within home   Self-Care   Usual Activity Tolerance moderate   Current Activity Tolerance fair   Regular Exercise No   Equipment Currently Used at Home none   Fall history within last six months yes   Number of times patient has fallen within last six months 2   Activity/Exercise/Self-Care Comment reports previously independent, not using any equipment   Instrumental Activities of Daily Living (IADL)   IADL Comments spouse can assist   General Information   Onset of Illness/Injury or Date of Surgery 08/05/23   Referring Physician Leslie Lopez   Patient/Family Therapy Goal Statement (OT) to get home   Additional Occupational Profile Info/Pertinent History of Current Problem Sunny Inman is a 78 year old male who presents to the emergency department as a transfer from [facility] emergency department where he was seen for evaluation of injury sustained in a fall.  Patient states that he had been drinking alcohol tonight.  He fell down some stairs and was found by his wife.  He was transported to [facility] emergency department where he was found to be intoxicated with alcohol level of 0.27.  CT imaging was performed including a pan scan.  CT is most remarkable for multiple small hemorrhagic contusions inferior frontal lobes along with a 2 mm subdural hematoma along the anterior inferior falx.  Patient also has multiple nasal bone fractures as well as a nondisplaced, and a comminuted right  acromial fracture.  His cervical spine CT was negative.  His CT chest, abdomen, pelvis was also negative.   Existing Precautions/Restrictions fall;other (see comments);weight bearing  (sinus)   Right Upper Extremity (Weight-bearing Status) non weight-bearing (NWB)  (sling when OOB)   General Observations and Info sinus precautions   Cognitive Status Examination   Orientation Status orientation to person, place and time   Cognitive Screens/Assessments   Cognitive Assessments Completed Blessed Orientation-Memory-Concentration   Blessed Orientation-Memory-Concentration Test:  Total Weighted Score out of 28 4   Blessed Orientation-Memory-Concentration Test Norms 0-8 equals normal to mild impairment   Blessed Orientation-Memory-Concentration Interpretation Pt with difficulty with naming months backwards and with delayed recall but scoring WFL   Visual Perception   Visual Impairment/Limitations WFL   Impact of Vision Impairment on Function (Vision) reports no concerns   Pain Assessment   Patient Currently in Pain No   Range of Motion Comprehensive   General Range of Motion no range of motion deficits identified;bilateral upper extremity ROM WFL   Comment, General Range of Motion R UE not tested due to sling, L UE WFL   Strength Comprehensive (MMT)   General Manual Muscle Testing (MMT) Assessment no strength deficits identified   Comment, General Manual Muscle Testing (MMT) Assessment R UE not tested due to fracture, L UE WFL   Coordination   Upper Extremity Coordination No deficits were identified   Bed Mobility   Comment (Bed Mobility) independent   Transfers   Transfer Comments independendent   Clinical Impression   Criteria for Skilled Therapeutic Interventions Met (OT) Evaluation only   OT Diagnosis decreased ADL independence   OT Problem List-Impairments impacting ADL pain;strength;cognition  (sinus precautions)   Assessment of Occupational Performance 3-5 Performance Deficits   Identified Performance Deficits  dressing, ambulation, cognition   Planned Therapy Interventions (OT) ADL retraining;IADL retraining;transfer training;strengthening;cognition   Clinical Decision Making Complexity (OT) low complexity   Risk & Benefits of therapy have been explained evaluation/treatment results reviewed;care plan/treatment goals reviewed;participants voiced agreement with care plan;patient   Clinical Impression Comments Pt presents with sinus precautions and sling for  RUE.  Pt benefited from one time eval with education to promote independence with ADL and return to home.  Pt scoring within normal range on cognitive assessment and reporting no additional concerns with discharge.   OT Total Evaluation Time   OT Eval, Low Complexity Minutes (21183) 15   OT Goals   Therapy Frequency (OT) One time eval and treatment   OT Predicted Duration/Target Date for Goal Attainment 08/06/23   OT Goals Hygiene/Grooming;Upper Body Dressing;Lower Body Dressing;Lower Body Bathing;Toilet Transfer/Toileting   OT: Hygiene/Grooming modified independent   OT: Toilet Transfer/Toileting Modified independent   OT: Cognitive Patient/caregiver will verbalize understanding of cognitive assessment results/recommendations as needed for safe discharge planning   Self-Care/Home Management   Self-Care/Home Mgmt/ADL, Compensatory, Meal Prep Minutes (08301) 9   Symptoms Noted During/After Treatment (Meal Preparation/Planning Training) none   Treatment Detail/Skilled Intervention Therapist eduicated Pt on sinus precautions following injury and educated on implications for ADL.  Pt verbalizing understanding of precautions and reports that they have been written up on the whiteboard.  Therapsit then reviewing results of cognitive screen and reports that Pt scores within normal range.  Encouraged Pt to receive assistance with med mgmt and meal prep if he felt like his memory was not improving and reports that spouse may be able to help him.  Pt reports no other concerns at  this time.   OT Discharge Planning   OT Plan dc   OT Discharge Recommendation (DC Rec) home with assist   OT Rationale for DC Rec Pt moving independently, may benefit from some assistance from spouse with IADL and to monitor cognition and ensure safety with med mgmt and meal prep

## 2023-08-06 NOTE — PLAN OF CARE
"Neuro: A&Ox4. CIWA scoring q4, scoring 0. Q4 Neuro checks WNL. Able to make needs known.   Cardiac: SR, with BBB. EKG done. VSS. Tele discontinued.    Respiratory: Sating >95% on RA. Denies any SOB. No cough.   GI/: Adequate urine output. Uses Bedside urinal. LBM was PTA pt reports \"couple days ago\" Passing gas. Miralax given. Ambulated x2.  Diet/appetite: Tolerating Regular diet. Eating well.  Activity:  SBA, up in chair this afternoon. Ambulated x2.   Pain: Denies  Skin: Bruising noted to face, BUE. Scabs to BLE shins.   LDA's: PIV to LFA, saline locked.    Sling to RUE, when OOB.     Plan: Head CT in the morning, and if stable, tentatively can discharge home.  Continue with POC. Notify primary team with changes.    "

## 2023-08-06 NOTE — PROVIDER NOTIFICATION
Dr. Lopez paged to notify BP soft 89/58, pt HR 58 per tele pt has 1st degree AV block and BBB, EKG from 2022 showed R)BBB, coreg not given due to soft BP, Dr. Lopez agreeable to not giving coreg. Instructed to recheck BP and call if SBP <90 in 1 hr.  Dr. Lopez made aware there is not a updated EKG, no new EKG ordered.  Pt has VS and neuros every 2 hrs, but is obs status, inquired about this being appropriate.  Also appears pt was placed on a hold at [facility] when pt tried to leave per note, pts wife does endorse she was given hold paper work however we do not have any hold paperwork.  Pt is A/Ox4 at this time, pt is not trying to leave,  Dr. Lopez was not aware of a hold, there is no order for a hold.     2205 Dr. Lopez made aware BP remains soft 86/56, MAP 67, instructed to hold clonodine.  Banana bag ordered, hold pts NS at 75 ml/hr while banana bag is running then restart when banana bag is finished.

## 2023-08-06 NOTE — PLAN OF CARE
Goal Outcome Evaluation:      Plan of Care Reviewed With: patient    OBS goals:    - Vital Signs normal or at patient baseline: BP low, pt reported some dizziness when he had gotten up and walked to bed from stretched with admission to floor.   - Adequate pain control on oral analgesia : Pt reports very little pain, reports 3/10 pain to shoulder.  - Tolerating oral intake to maintain hydration: Pt ate 100% of dinner, good PO intake.   - Diagnostic tests and consults completed and resulted: cards consult and OMFS consults yet to see  - Cleared for discharge from consultants' standpoint if involved in care: not all consultants have seen pt  - Return to baseline functional status: pt felt dizzy when up and walking  - Safe disposition plan has been identified : plan to return home with wife  - Nurse to notify provider when observation goals have been met and patient is ready for discharge.

## 2023-08-06 NOTE — PLAN OF CARE
Goal Outcome Evaluation:     Plan of Care Reviewed With: patient     OBS goals:    - Vital Signs normal or at patient baseline: B/P improved overnight with banana bag infusing at 125 ml/hr. BP at 0400 was 131/73. BP obtained q2h as ordered.  - Adequate pain control on oral analgesia : Pt has denied pain overnight. Pt. Is receiving scheduled tylenol as ordered and gabapentin.  - Tolerating oral intake to maintain hydration: Good PO intake per patient statement.  - Diagnostic tests and consults completed and resulted: cards consult and OMFS consults yet to see  - Cleared for discharge from consultants' standpoint if involved in care: not all consultants have seen pt  - Return to baseline functional status: pt moved well and independently to get out of bed and step on standing scale for weight measurement this morning.  - Safe disposition plan has been identified : plan to return home with wife  - Nurse to notify provider when observation goals have been met and patient is ready for discharge.

## 2023-08-06 NOTE — PROGRESS NOTES
SPIRITUAL HEALTH SERVICES  SPIRITUAL ASSESSMENT Progress Note  [facility]B     REFERRAL SOURCE: Admission Request     Brief visit with pt Sunny Inman.  No needs at this time but is aware of how to request a  should he want a visit.     PLAN: [facility] will remain available     Kika Trejo    Pager: [phone]

## 2023-08-06 NOTE — PROGRESS NOTES
[facility]  Trauma Service Progress Note    Date of Service: 08/06/2023    Trauma Mechanism: Fall down stairs   Date of Injury: 8/4/23  Known Injuries:  Multiple small hemorrhagic contusions: inferior frontal lobes   2mm SDH anterior falx   Anterior right frontal scalp hematoma   Multiple nasal bone fractures   Right shoulder nondisplaced, minimally comminutes acromial fracture       Assessment & Plan   Neuro/Pain/Psych:  # Found unconscious at bottom of stairs, +LOC,   - Initial Head CT @ 2157: Multiple small hemorrhagic contusions inferior frontal lobes along with tiny 2 mm subdural hematoma along the anterior inferior falx.   - Repeat Head CT @ 0528:  Evolving multicompartmental ICH. Grossly stable bifrontal intraparenchymal contusions with a small/thin subdural hematoma along the anterior/inferior falx. Question new intraparenchymal contusion involving the left anterior/inferior temporal lobe measuring up to 0.8 cm.  Question thin subdural hematoma along the right proximal tentorial leaflet measuring 2 mm, likely due to redistribution.  - 2nd repeat Head CT stable.  - q4 hours Neurochecks   - Neurosurgery following, awaiting discharge plan      # Acute traumatic pain   - Schedule: tylenol,         # Alcohol Abuse  # Acute alcohol intoxication  - ETOH @ 2042: 0.27  - CIWA in place  - Schedule: clonidine, folic acid, Gabapentin taper, MVI, Thiamine taper  - Prn: Zyprexa/ haldol  - Holding benzos at this time to not confound neurochecks      EENT:  # Nasal Bone fractures   # Dental disease, seen on CT  # s/p bilateral cataract surgery 2012  - Facial CT:  Multiple nasal bone fractures. Prominent anterior right frontal scalp hematoma extending to bilateral preseptal soft tissue swelling extending to the soft tissue anterior to the mandible and maxilla. Prominent dental disease   - Keep upright as much as able to help with swelling  - Nasal precautions: no nose blowing,  sneeze with mouth open, no heavy listing or straws     Pulmonary:  # COPD  # DAPHNEY  - Supplemental oxygen to keep saturation above 92 %.  - Incentive spirometer while awake   - continue PTA medications      Cardiovascular:    # Hypertension   # s/p CABG   # s/p bioprosthetic aortic valve replacement   - Monitor hemodynamic status.   - continue PTA: carvedilol, lisinopril      GI/Nutrition:    - Regular Diet    Renal/ Fluids/Electrolytes:  # Hyponatremia   - Na: 129?134  - Continue NS, Na labs q6   - Creatinine 0.71  - electrolyte replacement protocol in place.      Endocrine:  - No management indication.      Infectious disease:   -  No indications for antibiotics.      Hematology:    # Acute blood loss anemia   - Hgb: 13.6?12.6. Monitor and trend.   - Threshold for transfusion if hgb <7.0 or signs/symptoms of hypoperfusion.        Musculoskeletal:  # s/p arthroscopy left shoulder rotator cuff repair   # Weakness and deconditioning of chronic illness   - Physical and occupational therapy consults.     Skin:  # Ecchymosis to face  - dilgent cares to prevent skin breakdown and wound formation.       Lines/ tubes/ drains:  - PIV      General Cares:                 PPI/H2 blocker:  NA                DVT prophylaxis: pcd                Bowel Regimen/Date of last stool: in place                 Pulmonary toilet: deep cough                ETOH screen completed yes, positive                Frailty Score: 0     Code status:  Full Code      Discharge goals:   Adequate pain management: in process  VSS x24 hours: NA  Hemoglobin stable x 48 hours: NA  Ambulating safely and/or therapy evals complete: NA  Drains/lines removed or plan in place to manage: yes  Teaching done: in process  Other:  Expected D/C date: 1 day     Kusum Amador PA-C  To contact the trauma service use job code pager [phone],   Numeric texts or alpha text through [facility]     Interval History   Patient states he is felling ok. He still has excessive   ROS x 8  negative with exception of those things listed in interval hx    Physical Exam   Temp: 98.2  F (36.8  C) Temp src: Oral BP: 119/73 Pulse: 57   Resp: 10 SpO2: 93 % O2 Device: None (Room air) Oxygen Delivery: 2 LPM  Vitals:    08/05/23 0900 08/05/23 1831 08/06/23 0400   Weight: 80.6 kg (177 lb 12.8 oz) 73.2 kg (161 lb 6 oz) 78.6 kg (173 lb 4.5 oz)     Vital Signs with Ranges  Temp:  [97.4  F (36.3  C)-98.4  F (36.9  C)] 98.2  F (36.8  C)  Pulse:  [54-69] 57  Resp:  [10-20] 10  BP: ()/(56-81) 119/73  Cuff Mean (mmHg):  [70] 70  FiO2 (%):  [28 %] 28 %  SpO2:  [92 %-100 %] 93 %  I/O last 3 completed shifts:  In: 937.5 [P.O.:360; I.V.:577.5]  Out: 1600 [Urine:1600]    [place] Coma Scale - Total 15/15  Eye Response (E): 4   4= spontaneous, 3= to verbal/voice, 2= to pain, 1= No response   Verbal Response (V): 5   5= Orientated, converses, 4= Confused, converses, 3= Inappropriate words, 2= Incomprehensible sounds, 1=No response   Motor Response (M): 6   6= Obeys commands, 5= Localizes to pain, 4= Withdrawal to pain, 3=Fexion to pain, 2= Extension to pain, 1= No response     Constitutional: Awake, alert, cooperative, no apparent distress.  Eyes: Bilateral periorbital edema improving, extensive bilateral ecchymosis, PERRL, EOMI,  sclera clear, conjunctiva normal.  HENT: Normocephalic, dependent ecchymosis from forehead, down face bilaterally    Respiratory: No increased work of breathing, good air exchange, .  Cardiovascular:  regular rate and rhythm,   GI: Abdomen soft, non-distended, non-tender, no guarding  Genitourinary:  voids independently   Skin: Warm & dry.  Musculoskeletal: There is no redness, warmth, or swelling of the joints.  Neurologic: Awake, alert, oriented. Cranial nerves II-XII are grossly intact.  Strength and sensory is intact. No focal deficits.  Neuropsychiatric: Calm, normal eye contact, alert, affect appropriate to situation, oriented, thought process normal.

## 2023-08-06 NOTE — CONSULTS
"     Cardiology Inpatient Consultation  August 5, 2023    Reason for Consult:  A cardiology consult was requested by ER to provide clinical guidance regarding aspirin.     Assessment and Recommendation:    Patient with bioprosthetic aortic valve  ICH and risk of falls in the future  Alcohol abuse disorder.     79yo male that is currently admitted to the ER after a fall and he was found to have ICH. He is   stable. Cardiology was asked if it was ok to discontinue aspirin in a patient with bioprosthetic aortic valve. The patient needs to continue with aspirin life long after having a bioprosthetic aortic valve. We had a discussion with the patient about the indications of aspirin, and possible complications due to the presence of ICHs and possible falls in the future (due to alcohol abuse disorder). After discussion of pros and cons, he would like to be off aspirin, he understood complications of been off aspirin such as valve thrombosis and stroke. All questions were answered. Plan and diagnosis were discussed with the patient.     Plan:  Patient needs to be on aspirin due to bioprothetic aortic valve. After discussion about benefits and risk of being on Asprin. He would like to be off aspirin at least for now. Patient understood complications.   Cardiology will sign off.     Plan of care discussed with Dr. Pike who agrees with above plan.    Thank you for consulting the cardiovascular services at the [facility]. Please do not hesitate to call us with any questions.     Que Tam MD  Cardiology Fellow        HPI:   78 year old male with a past medical history of COPD, HTN, HLD, DAPHNEY, coronary artery bypass, and bioprosthetic aortic valve replacement on daily 81 mg aspirin who is admitted for  IPH/SDH.  The patient reports that earlier today while he was at home he \"had a bit too much to drink\" and fell down a set of stairs.  This occurred around 5 PM.  No loss of consciousness " but he did hit his head and face multiple times along the way.  He initially presented to [facility], and there was found to have multiple small hemorrhagic contusions in the inferior frontal lobes as well as a probable 2 mm subdural hematoma along the anterior inferior falx.  He was additionally found to have multiple nasal bone fractures and a nondisplaced, comminuted right acromial fracture.  CT C-spine negative.  CT CAP negative. He was transferred to [facility] for higher level of care given intracranial hemorrhage.     All labs and images were reviewed.     At the time of interview, the patient denies chest pain, dyspnea at rest or with exertion, orthopnea, PND, palpitations, lightheadedness, or syncope.     Review of Systems:    Complete review of systems was performed and negative except per HPI.    PMH:  Past Medical History:   Diagnosis Date    Chronic obstructive pulmonary disease (H) 5/11/2022    Heart disease     Hypertension     Obstructive sleep apnea syndrome 5/11/2022    Sleep apnea     CPAP     Active Problems:  Patient Active Problem List    Diagnosis Date Noted    Subdural hematoma (H) 08/05/2023     Priority: Medium    Fall down stairs, initial encounter 08/05/2023     Priority: Medium    Personal history of tobacco use, presenting hazards to health 05/12/2022     Priority: Medium    Encounter for screening laboratory testing for severe acute respiratory syndrome coronavirus 2 (SARS-CoV-2) 05/12/2022     Priority: Medium    Thoracic aortic aneurysm without rupture (H) 05/11/2022     Priority: Medium     Nov 17, 2015 Entered By: MABEL HOLDER Comment: noted on chest CT, follow up 11/2016 with thoracic CTA      Obstructive sleep apnea syndrome 05/11/2022     Priority: Medium     Oct 08, 2015 Entered By: MABEL HOLDER Comment: AHI 21      Hyperlipidemia 05/11/2022     Priority: Medium    Heart valve transplanted 05/11/2022     Priority: Medium     Oct 14, 2019 Entered By: UMANG CARRASCO Comment:  aortic      Former smoker 05/11/2022     Priority: Medium    Disorder involving thrombocytopenia (H) 05/11/2022     Priority: Medium    Coronary artery disease 05/11/2022     Priority: Medium    Congestive heart failure (H) 05/11/2022     Priority: Medium     May 12, 2017 Entered By: BRITTNEY LIMA Comment: Enrolled in CHF clinic. Sandra Ibarra, RN Case Manager. Call ext [phone] for appt.      Chronic obstructive pulmonary disease (H) 05/11/2022     Priority: Medium     Feb 24, 2011 Entered By: MABEL HOLDER Comment: Spirometry deomonstrates moderate obstruction      Benign prostatic hyperplasia 05/11/2022     Priority: Medium    Aortic stenosis 05/11/2022     Priority: Medium     May 18, 2016 Entered By: MABEL HOLDER Comment: dyspnea, echo: EF 35%, severe aortic stenosis      Alcohol abuse 05/11/2022     Priority: Medium    COPD exacerbation (H) 05/11/2022     Priority: Medium    Senile nuclear sclerosis 11/13/2012     Priority: Medium     Social History:  Social History     Tobacco Use    Smoking status: Former     Packs/day: 1.00     Years: 40.00     Pack years: 40.00     Types: Cigarettes    Tobacco comments:     Quit 8 years ago   Substance Use Topics    Alcohol use: Yes     Comment: average of 6 beers per day    Drug use: Not Currently     Family History:  Family History   Problem Relation Age of Onset    Heart Disease Father     Heart Surgery Father        Medications:   acetaminophen  975 mg Oral Q8H    carvedilol  12.5 mg Oral BID w/meals    cloNIDine  0.1 mg Oral Q8H    folic acid  1 mg Oral Daily    gabapentin  100 mg Oral Daily    [START ON 8/12/2023] gabapentin  100 mg Oral Q8H    [START ON 8/10/2023] gabapentin  300 mg Oral Q8H    [START ON 8/8/2023] gabapentin  600 mg Oral Q8H    gabapentin  900 mg Oral Q8H    lisinopril  5 mg Oral Daily    multivitamin w/minerals  1 tablet Oral Daily    sodium chloride (PF)  3 mL Intracatheter Q8H    thiamine  500 mg Intravenous TID    Followed by    [START ON  8/7/2023] thiamine  250 mg Intravenous Daily    Followed by    [START ON 8/12/2023] thiamine  100 mg Oral Daily    umeclidinium  1 puff Inhalation Daily        sodium chloride 75 mL/hr at 08/05/23 1715       Physical Exam:  Temp:  [97.4  F (36.3  C)-98.4  F (36.9  C)] 97.7  F (36.5  C)  Pulse:  [57-85] 57  Resp:  [11-24] 15  BP: ()/() 92/61  FiO2 (%):  [28 %] 28 %  SpO2:  [93 %-100 %] 100 %    Intake/Output Summary (Last 24 hours) at 8/5/2023 2005  Last data filed at 8/5/2023 1800  Gross per 24 hour   Intake 371.25 ml   Output 550 ml   Net -178.75 ml     GEN: pleasant  HEENT: bilateral periorbital ecchymosis, forehead laceration, forehead hematoma R > L   EYES: no icterus  CV: RRR, normal s1/s2, no murmurs/rubs/s3/s4, no heave. JVP not seen by me   CHEST: clear to ausculation bilaterally, no rales or wheezing  ABD: soft, non-tender, normal active bowel sounds  : no flank/suprapubic tenderness  EXTR: pulses present  No clubbing, cyanosis or edema.   NEURO: alert oriented, speech fluent/appropriate, motor grossly nonfocal  PSYCH: cooperative, affect appropriate, pleasant      Diagnostics:  All labs and imaging were reviewed, of note:    CMP  Recent Labs   Lab 08/05/23  1742 08/05/23  1133 08/04/23  2042   * 129* 129*   POTASSIUM  --   --  4.0   CHLORIDE  --   --  94*   CO2  --   --  21*   ANIONGAP  --   --  14   GLC  --   --  97   BUN  --   --  10.7   CR  --   --  0.71   GFRESTIMATED  --   --  >90   ERICA  --   --  8.4*   MAG  --  1.7  --    PHOS  --  2.9  --    PROTTOTAL  --   --  6.5   ALBUMIN  --   --  4.0   BILITOTAL  --   --  0.4   ALKPHOS  --   --  65   AST  --   --  26   ALT  --   --  17     CBC  Recent Labs   Lab 08/05/23  0650 08/04/23 2042   WBC 4.9 5.3   RBC 3.78* 3.99*   HGB 12.6* 13.6   HCT 37.0* 39.5*   MCV 98 99   MCH 33.3* 34.1*   MCHC 34.1 34.4   RDW 12.4 12.3   * 132*     INR  Recent Labs   Lab 08/04/23 2042   INR 1.02     Arterial Blood GasNo lab results found in last 7  days.    No results found for: TROPI, TROPONIN, TROPR, TROPN    EKG: No EKG during this admission.     Transthoracic echocardiogram:  08/19/2022 The visual ejection fraction is 55-60%. Septal motion is consistent with  conduction abnormality.  The right ventricle is mild to moderately dilated. The right ventricular  systolic function is moderately reduced.  There is a bioprosthetic aortic valve. Unclear type and size. MG is 12 mm Hg  trace AI.  The ascending aorta is moderately dilated. Measures 4.5 cms.  There is no pericardial effusion.    I saw, evaluated and examined patient with CV fellow.  I reviewed results of labs, EKG and imaging and discussed the results with patient and CV fellow.  I discussed the therapeutic plan with patient and CV fellow.  I agree with CV fellow's note and I edited the note to make it a more comprehensive note.    Yobany Pike MD, PhD  Professor of Medicine  Division of Cardiology

## 2023-08-06 NOTE — PLAN OF CARE
Observation goals:    - Occupational Therapy Traumatic Brain Injury Screen with outpatient treatment plan if needed.- Not met  - Vital Signs normal or at patient baseline- Met  - Adequate pain control on oral analgesia- Met  - Tolerating oral intake to maintain hydration -Met  - Diagnostic tests and consults completed and resulted- Not met  - Cleared for discharge from consultants' standpoint if involved in care.- Not met  - Return to baseline functional status- Not met   - Safe disposition plan has been identified - Not met  - Nurse to notify provider when observation goals have been met and patient is ready for discharge. -Not met     Pt A&Ox4, denies pain. CIWA score 0. To have OT consult today. Tolerating PO. Banana bag finishing, NS to be resumed after.

## 2023-08-06 NOTE — PLAN OF CARE
Occupational Therapy Discharge Summary    Reason for therapy discharge:    All goals and outcomes met, no further needs identified.    Progress towards therapy goal(s). See goals on Care Plan in [facility] electronic health record for goal details.  Goals met    Therapy recommendation(s):    No further therapy is recommended.  Monitor cognition at home, if continued concerns would recommend OP OT.

## 2023-08-06 NOTE — DISCHARGE SUMMARY
[facility]    Discharge Summary  Trauma Service     Date of Admission:  8/5/2023  Date of Discharge:  8/7/2023  Attending Physician: Dr Misael Whitehead   Discharging Provider: Kusum CEDILLO   Date of Service (when I saw the patient): 08/07/23    Primary Provider: [facility]  Primary Care clinic: [address] [facility] [id number]  Phone: [phone]  Fax number: [phone]     Discharge Diagnoses   Principal Problem:    Subdural hematoma (H)  Active Problems:    Fall down stairs, initial encounter    Alcoholic intoxication with complication (H)    Contusion of cerebrum without loss of consciousness, unspecified laterality, initial encounter (H)     Hospital Course   Neurosurgery Head Injury:  # Found unconscious at bottom of stairs, +LOC,   # Bifrontal intraparenchymal hemorrhages  # Traumatic SDH      Sunny Inman was evaluated by Neurosurgery and was managed non-operatively.  He had multiple repeat Head CTs during this admission. Prior to discharge his Head CT impression was:     Slightly decreased conspicuity of the right inferior frontal lobe parenchymal hemorrhage. Overall stable left inferior frontal lobe parenchymal hemorrhage . No subdural hemorrhage is visualized No acute  loss of gray-white matter differentiation in the cerebral hemispheres. Ventricles are proportionate to the cerebral sulci. Clear basalcisterns.    Patient is instructed to hold He was monitored with serial neurological examinations which remained stable.  Neurosurgery recommends follow up in Neurosurgery clinic in 2 weeks with repeat Head CT.  Patient can do this at the Neurosurgery OP clinic or [facility]     He was evaluated by physical and occupational therapies during his hospitalization.  Patient underwent Traumatic Brain Injury evaluation and screening by therapies. He has been provided with information for TBI follow-up.  Please see summary of most current therapy notes below.       # Acute traumatic pain   - Schedule: tylenol,      # Alcohol Abuse  # Acute alcohol intoxication and admission   On admission ETOH level was 0.27. Patient was placed on CIWA protocols including: Schedule: clonidine, folic acid, Gabapentin taper, MVI, Thiamine taper. Patient averaged a score of 0 during admission. Patient recommend to abstain from alcohol in the setting of a new head injury.    # Nasal Bone fractures   - Facial CT:  Multiple nasal bone fractures. Prominent anterior right frontal scalp hematoma extending to bilateral preseptal soft tissue swelling extending to the soft tissue anterior to the mandible and maxilla. Prominent dental disease   - Keep upright as much as able to help with swelling  Patient recommended to follow-up with ENT if needed.     # Hyponatremia   Patient had a sodium on admission of 129, after being placed on 0.9% Sodium Chloride to 134. His chronic hyponatremia is probably 2/2 alcohol abuse.        # s/p bioprosthetic aortic valve replacement   # Platelet Dysfunction 2/2 ASA  Patient was seen by Cardiology to assess if patient's risk if aspirin is held. Per note:  Patient needs to be on aspirin due to bioprothetic aortic valve. After discussion about benefits and risk of being on Asprin. He would like to be off aspirin at least for now. Patient understood complications.   Neurosurgery recommended to hold ASA for 10 days.        # Acute blood loss anemia   On admission patient had a small hemoglobin drop from 13.6?12.6. Patient did not require any blood products during admission. Threshold for transfusion if hgb <7.0 or signs/symptoms of hypoperfusion.        Therapy Recommendations:   Current status of occupational therapies on discharge: home with assist. Pt moving independently, may benefit from some assistance from spouse with IADL and to monitor cognition and ensure safety with med mgmt and meal prep. mod I, SBA for  line mgmt            Code Status   Full Code    SUBJECTIVE: Sunny Inman is a 78 year old male who presented to the ED as a transfer from [facility] on 8/4/23.     Per ED note: Patient states that he had been drinking alcohol tonight.  He fell down some stairs and was found by his wife.  He was transported to [facility] emergency department where he was found to be intoxicated with alcohol level of 0.27.  CT imaging was performed including a pan scan.  CT is most remarkable for multiple small hemorrhagic contusions inferior frontal lobes along with a 2 mm subdural hematoma along the anterior inferior falx.  Patient also has multiple nasal bone fractures as well as a nondisplaced, and a comminuted right acromial fracture.    Patient was transferred to [facility] for Trauma and Neurosurgery evaluation.    Physical Exam   Temp: 97.9  F (36.6  C) Temp src: Oral BP: (!) 154/79 Pulse: 67   Resp: 18 SpO2: 92 % O2 Device: None (Room air)    Vitals:    08/05/23 1831 08/06/23 0400 08/07/23 0400   Weight: 73.2 kg (161 lb 6 oz) 78.6 kg (173 lb 4.5 oz) 76.6 kg (168 lb 14 oz)     Vital Signs with Ranges  Temp:  [96.8  F (36  C)-98.5  F (36.9  C)] 97.9  F (36.6  C)  Pulse:  [54-67] 67  Resp:  [12-18] 18  BP: (103-154)/(63-79) 154/79  Cuff Mean (mmHg):  [46] 46  SpO2:  [90 %-97 %] 92 %  I/O last 3 completed shifts:  In: 1880 [P.O.:1320; I.V.:560]  Out: 1250 [Urine:1250]    Constitutional: Awake, alert, cooperative, no apparent distress.  Eyes: Bilateral periorbital edema improving, extensive bilateral ecchymosis, PERRL, EOMI,  sclera clear, conjunctiva normal.  HENT: Normocephalic, dependent ecchymosis from forehead, down face bilaterally    Respiratory: No increased work of breathing, good air exchange, .  Cardiovascular:  regular rate and rhythm,   GI: Abdomen soft, non-distended, non-tender, no guarding  Genitourinary:  voids independently   Skin: Warm & dry.  Musculoskeletal: There is no redness, warmth, or swelling of the  joints.  Neurologic: Awake, alert, oriented. Cranial nerves II-XII are grossly intact.  Strength and sensory is intact. No focal deficits.  Neuropsychiatric: Calm, normal eye contact, alert, affect appropriate to situation, oriented, thought process normal.    Discharge Disposition   Discharged to home  Condition at discharge: Stable  Discharge VS: Blood pressure 108/70, pulse 64, temperature 98.3  F (36.8  C), temperature source Oral, resp. rate 16, height 1.829 m (6'), weight 78.6 kg (173 lb 4.5 oz), SpO2 93 %.    Consultations This Hospital Stay   TRAUMA SURGERY IP CONSULT  NEUROSURGERY ADULT IP CONSULT  CARE MANAGEMENT / SOCIAL WORK IP CONSULT  PHYSICAL THERAPY ADULT IP CONSULT  OCCUPATIONAL THERAPY ADULT IP CONSULT  CARDIOLOGY GENERAL ADULT IP CONSULT    Discharge Orders      Reason for your hospital stay    Fall down stairs, intracranial hemorrhages     Activity    Your activity upon discharge: activity as tolerated     Diet    Follow this diet upon discharge: Orders Placed This Encounter      Regular Diet Adult     Discharge Medications   Current Discharge Medication List        CONTINUE these medications which have NOT CHANGED    Details   albuterol (PROAIR HFA/PROVENTIL HFA/VENTOLIN HFA) 108 (90 Base) MCG/ACT inhaler Inhale 2 puffs into the lungs 4 times daily as needed for shortness of breath      carvedilol (COREG) 25 MG tablet Take 12.5 mg by mouth 2 times daily (with meals)      lisinopril (ZESTRIL) 10 MG tablet Take 0.5 tablets by mouth daily      tiotropium (SPIRIVA RESPIMAT) 2.5 MCG/ACT inhaler Inhale 2 puffs into the lungs daily           STOP taking these medications       ASPIRIN PO Comments:   Reason for Stopping:             Allergies   Allergies   Allergen Reactions    Nkda [No Known Drug Allergy]      Data   Most Recent 3 CBC's:  Recent Labs   Lab Test 08/05/23  0650 08/04/23  2042 05/12/22  0443   WBC 4.9 5.3 3.1*   HGB 12.6* 13.6 14.3   MCV 98 99 98   * 132* 108*      Most Recent 3  BMP's:  Recent Labs   Lab Test 08/06/23  1213 08/06/23  0536 08/06/23  0219 08/05/23  1133 08/04/23 2042 05/12/22  0443 05/11/22  1559   * 134* 131*   < > 129* 136 143   POTASSIUM  --   --   --   --  4.0 4.2 3.5   CHLORIDE  --   --   --   --  94* 103 114*   CO2  --   --   --   --  21* 27 24   BUN  --   --   --   --  10.7 16 10   CR  --   --   --   --  0.71 0.73 0.73   ANIONGAP  --   --   --   --  14 6 5   ERICA  --   --   --   --  8.4* 8.3* 6.7*   GLC  --   --   --   --  97 141* 93    < > = values in this interval not displayed.     Most Recent 2 LFT's:  Recent Labs   Lab Test 08/04/23 2042   AST 26   ALT 17   ALKPHOS 65   BILITOTAL 0.4     Most Recent INR's and Anticoagulation Dosing History:  Anticoagulation Dose History          Latest Ref Rng & Units 8/4/2023   Recent Dosing and Labs   INR 0.85 - 1.15 1.02      Most Recent 3 Troponin's:No lab results found.  Most Recent 6 Bacteria Isolates From Any Culture (See EPIC Reports for Culture Details):No lab results found.  Most Recent TSH, T4 and A1c Labs:No lab results found.  Results for orders placed or performed during the hospital encounter of 08/05/23   CT Head w/o Contrast    Narrative    EXAM: CT HEAD WITHOUT CONTRAST  LOCATION: [facility]  DATE: 08/05/2023    INDICATION: Follow-up subdural hematoma and cerebral contusion.  COMPARISON: CT head dated 08/04/2023.  TECHNIQUE: Routine CT Head without IV contrast. Multiplanar reformats. Dose reduction techniques were used.    FINDINGS:  INTRACRANIAL CONTENTS: Question subtle subdural hematoma along the right proximal posterior tentorial leaflet measuring 2 mm (image 32 series 8), possibly due to redistribution. Grossly stable bilateral anterior/inferior frontal lobe intraparenchymal   contusions. Question interval development of a left anterior temporal lobe intraparenchymal contusion measuring up to 0.8 cm (image 10 series 4). Stable probable thin subdural  hematoma along the anterior/inferior falx measuring 1-2 mm. Stable brain   parenchymal attenuation. Mild to moderate generalized volume loss. No hydrocephalus. No midline shift.    VISUALIZED ORBITS/SINUSES/MASTOIDS: No intraorbital abnormality. Air-fluid level identified involving the bilateral frontal sinuses. Moderate scattered paranasal sinus mucosal thickening noted involving the ethmoid air cells and maxillary sinuses . No   definite middle ear or mastoid effusion.    BONES/SOFT TISSUES: Stable calvarium. Stable depressed fracture involving the left nasal bone. Large right frontal scalp contusion. Large right periorbital contusion.      Impression    IMPRESSION:  1.  Evolving multicompartmental intracranial hemorrhage, as above.  2.  Grossly stable bifrontal intraparenchymal contusions with a small/thin subdural hematoma along the anterior/inferior falx.  3.  Question new intraparenchymal contusion involving the left anterior/inferior temporal lobe measuring up to 0.8 cm.  4.  Question thin subdural hematoma along the right proximal tentorial leaflet measuring 2 mm, likely due to redistribution.  5.  Stable depressed left nasal bone fracture.       CT Head w/o Contrast    Narrative    EXAM: CT HEAD W/O CONTRAST  8/5/2023 9:49 AM     HISTORY:  Follow contusions       COMPARISON:  Earlier same day head CT from 0519 hours    TECHNIQUE: Using multidetector thin collimation helical acquisition  technique, axial, coronal and sagittal CT images from the skull base  to the vertex were obtained without intravenous contrast.   (topogram) image(s) also obtained and reviewed.    FINDINGS:  Stable left greater than right bilateral anteroinferior frontal lobe  hemorrhage (series 6 image 26). Additional 5 mm focus within the right  inferior frontal lobe (series 6 image 26), is also unchanged. No new  intracranial hemorrhage.  Previously visualized questionable small  hemorrhages are nonvisualized on this study, could be  artifactual and  partial volume. No acute change in gray-white matter differentiation  in the cerebral hemispheres. Ventricles are proportionate to the  cerebral sulci. Clear basal cisterns. No midline shift.    The bony calvaria and the bones of the skull base are normal. Mildly  depressed nasal fracture. No significant change in right anterior  frontal and right periorbital soft tissue hematoma. Layering  fluid/hemorrhage in the frontal sinus and ethmoid air cells.  Mastoid  air cells are clear. Grossly normal orbits.       Impression    IMPRESSION:   1. Stable left greater than right small bilateral anteroinferior  frontal lobe intraparenchymal hemorrhages. No new intracranial  hemorrhage.  2. No significant change in right anterior frontal and right  periorbital soft tissue hematoma.   3. Mildly depressed nasal fracture.    I have personally reviewed the examination and initial interpretation  and I agree with the findings.    MINOR RIVERA MD         SYSTEM ID:  [id number]       Time Spent on this Encounter   I, Kusum Amador PA-C, personally saw the patient today and spent greater than 30 minutes discharging this patient.    We appreciate the opportunity to care for your patient while in the hospital.  Should you have any questions about their injuries or this discharge summary our contact information is below.    Trauma Service   [facility]   [address] [place] [address]  [phone]

## 2023-08-07 ENCOUNTER — APPOINTMENT (OUTPATIENT)
Dept: CT IMAGING | Facility: CLINIC | Age: 78
DRG: 083 | End: 2023-08-07
Payer: COMMERCIAL

## 2023-08-07 VITALS
WEIGHT: 168.87 LBS | DIASTOLIC BLOOD PRESSURE: 78 MMHG | SYSTOLIC BLOOD PRESSURE: 126 MMHG | TEMPERATURE: 98.2 F | OXYGEN SATURATION: 97 % | RESPIRATION RATE: 16 BRPM | BODY MASS INDEX: 22.87 KG/M2 | HEART RATE: 61 BPM | HEIGHT: 72 IN

## 2023-08-07 LAB
ATRIAL RATE - MUSE: 58 BPM
DIASTOLIC BLOOD PRESSURE - MUSE: NORMAL MMHG
INTERPRETATION ECG - MUSE: NORMAL
P AXIS - MUSE: 22 DEGREES
PR INTERVAL - MUSE: 222 MS
QRS DURATION - MUSE: 164 MS
QT - MUSE: 476 MS
QTC - MUSE: 467 MS
R AXIS - MUSE: 38 DEGREES
SYSTOLIC BLOOD PRESSURE - MUSE: NORMAL MMHG
T AXIS - MUSE: -19 DEGREES
VENTRICULAR RATE- MUSE: 58 BPM

## 2023-08-07 PROCEDURE — 250N000013 HC RX MED GY IP 250 OP 250 PS 637: Performed by: PHYSICIAN ASSISTANT

## 2023-08-07 PROCEDURE — 70450 CT HEAD/BRAIN W/O DYE: CPT

## 2023-08-07 PROCEDURE — 258N000003 HC RX IP 258 OP 636: Performed by: PHYSICIAN ASSISTANT

## 2023-08-07 PROCEDURE — 99239 HOSP IP/OBS DSCHRG MGMT >30: CPT | Performed by: PHYSICIAN ASSISTANT

## 2023-08-07 PROCEDURE — 250N000011 HC RX IP 250 OP 636: Performed by: PHYSICIAN ASSISTANT

## 2023-08-07 PROCEDURE — 250N000013 HC RX MED GY IP 250 OP 250 PS 637: Performed by: SURGERY

## 2023-08-07 PROCEDURE — 70450 CT HEAD/BRAIN W/O DYE: CPT | Mod: 26 | Performed by: RADIOLOGY

## 2023-08-07 RX ADMIN — CARVEDILOL 12.5 MG: 12.5 TABLET, FILM COATED ORAL at 09:19

## 2023-08-07 RX ADMIN — ACETAMINOPHEN 975 MG: 325 TABLET, FILM COATED ORAL at 06:49

## 2023-08-07 RX ADMIN — GABAPENTIN 900 MG: 300 CAPSULE ORAL at 06:48

## 2023-08-07 RX ADMIN — THIAMINE HYDROCHLORIDE 250 MG: 100 INJECTION, SOLUTION INTRAMUSCULAR; INTRAVENOUS at 09:59

## 2023-08-07 RX ADMIN — ALBUTEROL SULFATE 2 PUFF: 90 AEROSOL, METERED RESPIRATORY (INHALATION) at 09:19

## 2023-08-07 RX ADMIN — Medication 1 TABLET: at 09:18

## 2023-08-07 RX ADMIN — CLONIDINE HYDROCHLORIDE 0.1 MG: 0.1 TABLET ORAL at 06:49

## 2023-08-07 RX ADMIN — FOLIC ACID 1 MG: 1 TABLET ORAL at 09:18

## 2023-08-07 RX ADMIN — LISINOPRIL 5 MG: 2.5 TABLET ORAL at 09:11

## 2023-08-07 RX ADMIN — UMECLIDINIUM 1 PUFF: 62.5 AEROSOL, POWDER ORAL at 09:11

## 2023-08-07 ASSESSMENT — ACTIVITIES OF DAILY LIVING (ADL)
ADLS_ACUITY_SCORE: 24

## 2023-08-07 NOTE — PLAN OF CARE
Neuro: A&Ox4. CIWA, scoring 0. Q4 neuros. Nasopharyngeal precautions.  Cardiac: SR. VSS.   Respiratory: Sating 90-92% on RA.  GI/: Adequate urine output. Urinal.  Diet/appetite: Tolerating regular diet. Eating well.  Activity:  SBA  Pain: Denies  Skin: No new deficits noted.  LDA's: L PIV - TKO    RUE sling when OOB.    Plan: Head CT done. Can go home if stable. Continue with POC. Notify primary team with changes.

## 2023-08-07 NOTE — PLAN OF CARE
Goal Outcome Evaluation:    DISCHARGE                         8/7/2023 12:00 PM  ----------------------------------------------------------------------------  Discharged to: Home  Via: private transportation  Accompanied by: Family  Discharge Instructions:regular diet, as tolerated activity, medications, follow up appointments, when to call the MD, aftercare instructions.  Prescriptions: To be filled by  none sent  pharmacy; medication list reviewed & sent with pt  Follow Up Appointments: arranged; information given  Belongings: All sent with pt  IV: d/c'd  Telemetry: d/c'd  Pt exhibits understanding of above discharge instructions; all questions answered.    Discharge Paperwork: Signed, copied, and sent home with patient.

## 2023-08-07 NOTE — PLAN OF CARE
Goal Outcome Evaluation:    Plan of Care Reviewed With: patient     OBS goals:    - Vital Signs normal or at patient baseline: Progressing  - Adequate pain control on oral analgesia: Complete  - Tolerating oral intake to maintain hydration: Progressing  - Diagnostic tests and consults completed and resulted: Progressing  - Cleared for discharge from consultants' standpoint if involved in care: Progressing  - Return to baseline functional status: Progressing  - Safe disposition plan has been identified: Complete  - Nurse to notify provider when observation goals have been met and patient is ready for discharge.

## 2023-08-07 NOTE — PROGRESS NOTES
"[facility]   08/07/2023  Neurosurgery Progress Note:    Assessment:  78 year old male on daily 81mg aspirin presenting with nasal bone fractures, acromial fracture, small hemorrhagic bifrontal contusions, and questionable subdural hematoma after an intoxicated fall.  Repeat head CTs have been stable and he is neurologically intact.      Plan:  - Repeat head CT 8/7 AM  - Hold off on aspirin for at least 10 days   - Follow-up with neurosurgery outpatient with a repeat head CT   - Ok to discharge from neurosurgery standpoint  - Neurosurgery will continue to follow in the meantime    Carmen Garrett MD  PGY-1   Neurosurgery     Interval History:   Sunny Inman is a 78 year old male with a past medical history of COPD, HTN, HLD, DAPHNEY, coronary artery bypass, and bioprosthetic aortic valve replacement on daily 81 mg aspirin who was seen for IPH/SDH.  The patient reports that earlier today while he was at home he \"had a bit too much to drink\" and fell down a set of stairs.  This occurred around 5 PM.  No loss of consciousness but he did hit his head and face multiple times along the way.  He initially presented to [facility], and there was found to have multiple small hemorrhagic contusions in the inferior frontal lobes as well as a probable 2 mm subdural hematoma along the anterior inferior falx.  He was additionally found to have multiple nasal bone fractures and a nondisplaced, comminuted right acromial fracture.  CT C-spine negative.  CT CAP negative. He was transferred to [facility] for higher level of care given intracranial hemorrhage.     Objective:   Temp:  [96.8  F (36  C)-98.5  F (36.9  C)] 98.2  F (36.8  C)  Pulse:  [58-67] 61  Resp:  [16-18] 16  BP: (103-154)/(63-79) 126/78  Cuff Mean (mmHg):  [46] 46  SpO2:  [90 %-97 %] 93 %  I/O last 3 completed shifts:  In: 1880 [P.O.:1320; I.V.:560]  Out: 1250 [Urine:1250]    Gen: Appears comfortable, NAD  Neurologic:  - Alert & Oriented " to person, place, time, and situation  - Follows commands briskly  - Speech fluent, spontaneous. No aphasia or dysarthria.  - No gaze preference. No apparent hemineglect.  - PERRL, EOMI  - Face symmetric with sensation intact to light touch  - Palate elevates symmetrically, uvula midline, tongue protrudes midline  - Trapezii muscles 5/5 bilaterally  - No pronator drift     Del Tr Bi WE WF Gr   R 5 5 5 5 5 5   L 5 5 5 5 5 5    HF KE KF DF PF EHL   R 5 5 5 5 5 5   L 5 5 5 5 5 5     Reflexes 2+ throughout    Sensation intact and symmetric to light touch throughout    LABS:  Recent Labs   Lab 08/06/23  1213 08/06/23  0536 08/06/23  0219 08/05/23  1133 08/04/23  2042   * 134* 131*   < > 129*   POTASSIUM  --   --   --   --  4.0   CHLORIDE  --   --   --   --  94*   CO2  --   --   --   --  21*   ANIONGAP  --   --   --   --  14   GLC  --   --   --   --  97   BUN  --   --   --   --  10.7   CR  --   --   --   --  0.71   ERICA  --   --   --   --  8.4*    < > = values in this interval not displayed.       Recent Labs   Lab 08/05/23  0650   WBC 4.9   RBC 3.78*   HGB 12.6*   HCT 37.0*   MCV 98   MCH 33.3*   MCHC 34.1   RDW 12.4   *       IMAGING:  Recent Results (from the past 24 hour(s))   CT Head w/o Contrast    Narrative    EXAM: CT HEAD W/O CONTRAST  8/7/2023 4:11 AM     HISTORY: f/u hemorrhagic contusions and SDH       COMPARISON: 8/5/2023    TECHNIQUE: Using multidetector thin collimation helical acquisition  technique, axial, coronal and sagittal CT images from the skull base  to the vertex were obtained without intravenous contrast.   (topogram) image(s) also obtained and reviewed.    FINDINGS:  Slightly decreased conspicuity of the right inferior frontal lobe  parenchymal hemorrhage. Overall stable left inferior frontal lobe  parenchymal hemorrhage . No subdural hemorrhage is visualized No acute  loss of gray-white matter differentiation in the cerebral hemispheres.  Ventricles are proportionate to the  cerebral sulci. Clear basal  cisterns.    Stable appearance of the mildly depressed nasal fracture. Right  supraorbital hematoma. Partially opacification of the ethmoid cells.  Otherwise, the visualized portions of the paranasal sinuses and  mastoid air cells are clear.  Bilateral pseudophakia.      Impression    IMPRESSION:   1. Evolving intraparenchymal hemorrhages along the bilateral inferior  frontal lobes.  2. No subdural hemorrhage is visualized.  3. Stable right supraorbital hematoma and nasal fracture.    I have personally reviewed the examination and initial interpretation  and I agree with the findings.    ELVER YIP MD         SYSTEM ID:  [id number]

## 2023-08-07 NOTE — DISCHARGE INSTRUCTIONS
Do not take Aspirin for 10 days after discharge     Abstain from alcohol at this time. You have bruises within your brain so your brain is more sensitive at this time to alcohol and other substances.

## 2023-08-09 ENCOUNTER — PATIENT OUTREACH (OUTPATIENT)
Dept: CARE COORDINATION | Facility: CLINIC | Age: 78
End: 2023-08-09

## 2023-08-09 NOTE — PROGRESS NOTES
[facility] Care Resource Center Contact  [facility]/Voicemail     Clinical Data: Post-Discharge Outreach     Outreach attempted x 2.  Left message on patient's voicemail, providing [facility]'s 24/7 scheduling and nurse triage phone number [phone] ([phone]) for questions/concerns and/or to schedule an appt with an [facility] provider, if they do not have a PCP.      Plan:  [facility] will do no further outreaches at this time.       Nasrin Ash MA  Connected Care Resource Center, [facility]    *Connected Care Resource Team does NOT follow patient ongoing. Referrals are identified based on internal discharge reports and the outreach is to ensure patient has an understanding of their discharge instructions.

## 2023-08-10 ENCOUNTER — TELEPHONE (OUTPATIENT)
Dept: NEUROSURGERY | Facility: CLINIC | Age: 78
End: 2023-08-10

## 2025-03-03 NOTE — ED NOTES
First contact with patient.  All discharge home information given and all questions answered.  Patient ambulates out of department with steady gate.        
Pt arrives ambulatory from triage following walking through [place] today and noted right lower leg pain. Called daughter who was concerned for DVT. Pt reports no hx of DVT, not on blood thinners. Offered tylenol, ibuprofen or heat pack. Declined at this time. Pt in room, oriented to room and call light. Call light within reach.  
Quality 226: Preventive Care And Screening: Tobacco Use: Screening And Cessation Intervention: Patient screened for tobacco use and is an ex/non-smoker
Quality 137: Melanoma: Continuity Of Care - Recall System: Patient information entered into a recall system that includes: target date for the next exam specified AND a process to follow up with patients regarding missed or unscheduled appointments
Detail Level: Detailed

## (undated) DEVICE — DECANTER VIAL 2006S

## (undated) DEVICE — PAD FLOOR SURGISAFE

## (undated) DEVICE — SLING ARM MED 0814-0063

## (undated) DEVICE — DRAPE ARTHROSCOPY SHOULDER BEACHCHAIR 29369

## (undated) DEVICE — BLANKET BAIR HUGGER LOWER BODY 42568

## (undated) DEVICE — PREP SKIN SCRUB TRAY 4461A

## (undated) DEVICE — Device

## (undated) DEVICE — BLADE SHAVER ARTHRO 4.2MM CUDA C9254

## (undated) DEVICE — IMM KIT SHOULDER TMAX MASK FACE 7210559

## (undated) DEVICE — PACK SHOULDER

## (undated) DEVICE — DRSG XEROFORM 5X9" 8884431605

## (undated) DEVICE — GLOVE PROTEXIS W/NEU-THERA 7.0  2D73TE70

## (undated) DEVICE — IMM KIT SHOULDER STABILIZATION 7210573

## (undated) DEVICE — GOWN LG DISP 9515

## (undated) DEVICE — TAPE MEDIPORE 4"X10YD 2964

## (undated) DEVICE — SUCTION TIP POOLE K770

## (undated) DEVICE — SOL WATER IRRIG 1000ML BOTTLE 07139-09

## (undated) DEVICE — NDL SCORPION ARTHREX KNEE AR-13990N

## (undated) DEVICE — SU ETHILON 3-0 FS-1 18" 669H

## (undated) DEVICE — GLOVE PROTEXIS BLUE W/NEU-THERA 7.0  2D73EB70

## (undated) DEVICE — PREP CHLORHEXIDINE 4% 4OZ (HIBICLENS) 57504

## (undated) DEVICE — GLOVE PROTEXIS W/NEU-THERA 7.5  2D73TE75

## (undated) DEVICE — ARTHROSCOPIC CANNULA TWIST-IN PURPLE 7MMX7CM AR-6570

## (undated) DEVICE — ESU ARTHROWAND 90DEG RF AMBIENT SUPER TURBOVAC ASHA4250-01

## (undated) DEVICE — ARTHROSCOPIC CANNULA 5.75MMX7CM  AR-6560

## (undated) DEVICE — TUBING PUMP LINVATEC 10K150

## (undated) DEVICE — DRSG ABDOMINAL 07 1/2X8" 7197D

## (undated) RX ORDER — OXYCODONE HYDROCHLORIDE 5 MG/1
TABLET ORAL
Status: DISPENSED
Start: 2018-03-27

## (undated) RX ORDER — ROPIVACAINE HYDROCHLORIDE 5 MG/ML
INJECTION, SOLUTION EPIDURAL; INFILTRATION; PERINEURAL
Status: DISPENSED
Start: 2018-03-27

## (undated) RX ORDER — PROPOFOL 10 MG/ML
INJECTION, EMULSION INTRAVENOUS
Status: DISPENSED
Start: 2018-03-27

## (undated) RX ORDER — LIDOCAINE HYDROCHLORIDE AND EPINEPHRINE 10; 10 MG/ML; UG/ML
INJECTION, SOLUTION INFILTRATION; PERINEURAL
Status: DISPENSED
Start: 2018-03-27

## (undated) RX ORDER — PHENYLEPHRINE HCL IN 0.9% NACL 1 MG/10 ML
SYRINGE (ML) INTRAVENOUS
Status: DISPENSED
Start: 2018-03-27

## (undated) RX ORDER — EPHEDRINE SULFATE 50 MG/ML
INJECTION, SOLUTION INTRAVENOUS
Status: DISPENSED
Start: 2018-03-27

## (undated) RX ORDER — GLYCOPYRROLATE 0.2 MG/ML
INJECTION, SOLUTION INTRAMUSCULAR; INTRAVENOUS
Status: DISPENSED
Start: 2018-03-27

## (undated) RX ORDER — ROPIVACAINE HYDROCHLORIDE 7.5 MG/ML
INJECTION, SOLUTION EPIDURAL; PERINEURAL
Status: DISPENSED
Start: 2018-03-27

## (undated) RX ORDER — ACETAMINOPHEN 325 MG/1
TABLET ORAL
Status: DISPENSED
Start: 2018-03-27

## (undated) RX ORDER — GABAPENTIN 300 MG/1
CAPSULE ORAL
Status: DISPENSED
Start: 2018-03-27

## (undated) RX ORDER — NEOSTIGMINE METHYLSULFATE 1 MG/ML
VIAL (ML) INJECTION
Status: DISPENSED
Start: 2018-03-27

## (undated) RX ORDER — FENTANYL CITRATE 50 UG/ML
INJECTION, SOLUTION INTRAMUSCULAR; INTRAVENOUS
Status: DISPENSED
Start: 2018-03-27

## (undated) RX ORDER — ONDANSETRON 2 MG/ML
INJECTION INTRAMUSCULAR; INTRAVENOUS
Status: DISPENSED
Start: 2018-03-27

## (undated) RX ORDER — DEXAMETHASONE SODIUM PHOSPHATE 4 MG/ML
INJECTION, SOLUTION INTRA-ARTICULAR; INTRALESIONAL; INTRAMUSCULAR; INTRAVENOUS; SOFT TISSUE
Status: DISPENSED
Start: 2018-03-27

## (undated) RX ORDER — LIDOCAINE HYDROCHLORIDE 10 MG/ML
INJECTION, SOLUTION EPIDURAL; INFILTRATION; INTRACAUDAL; PERINEURAL
Status: DISPENSED
Start: 2018-03-27